# Patient Record
Sex: FEMALE | Race: WHITE | Employment: OTHER | URBAN - METROPOLITAN AREA
[De-identification: names, ages, dates, MRNs, and addresses within clinical notes are randomized per-mention and may not be internally consistent; named-entity substitution may affect disease eponyms.]

---

## 2017-01-13 PROBLEM — M16.11 ARTHRITIS OF RIGHT HIP: Status: ACTIVE | Noted: 2017-01-13

## 2017-01-16 PROBLEM — M16.11 PRIMARY OSTEOARTHRITIS OF RIGHT HIP: Status: ACTIVE | Noted: 2017-01-16

## 2017-06-21 PROBLEM — Z11.59 NEED FOR HEPATITIS C SCREENING TEST: Status: ACTIVE | Noted: 2017-06-21

## 2017-06-21 PROBLEM — Z12.31 SCREENING MAMMOGRAM, ENCOUNTER FOR: Status: ACTIVE | Noted: 2017-06-21

## 2017-06-21 PROBLEM — Z79.899 ENCOUNTER FOR LONG-TERM (CURRENT) USE OF MEDICATIONS: Status: ACTIVE | Noted: 2017-06-21

## 2017-06-21 PROBLEM — R73.01 ELEVATED FASTING GLUCOSE: Status: ACTIVE | Noted: 2017-06-21

## 2017-12-20 PROBLEM — Z79.899 ENCOUNTER FOR LONG-TERM (CURRENT) USE OF MEDICATIONS: Status: ACTIVE | Noted: 2017-12-20

## 2017-12-20 PROBLEM — Z11.59 NEED FOR HEPATITIS C SCREENING TEST: Status: ACTIVE | Noted: 2017-12-20

## 2017-12-20 PROBLEM — Z12.31 SCREENING MAMMOGRAM, ENCOUNTER FOR: Status: ACTIVE | Noted: 2017-12-20

## 2017-12-20 PROBLEM — Z28.21 REFUSED INFLUENZA VACCINE: Status: ACTIVE | Noted: 2017-12-20

## 2018-06-20 PROBLEM — Z12.31 SCREENING MAMMOGRAM, ENCOUNTER FOR: Status: RESOLVED | Noted: 2017-12-20 | Resolved: 2018-06-20

## 2018-06-20 PROBLEM — Z79.899 ENCOUNTER FOR LONG-TERM (CURRENT) USE OF MEDICATIONS: Status: RESOLVED | Noted: 2017-12-20 | Resolved: 2018-06-20

## 2018-11-05 ENCOUNTER — LAB ENCOUNTER (OUTPATIENT)
Dept: LAB | Age: 68
End: 2018-11-05
Attending: DERMATOLOGY
Payer: MEDICARE

## 2018-11-05 DIAGNOSIS — L65.9 BALDNESS: ICD-10-CM

## 2018-11-05 DIAGNOSIS — Z79.899 NEED FOR PROPHYLACTIC CHEMOTHERAPY: ICD-10-CM

## 2018-11-05 DIAGNOSIS — Z00.00 EXAMINATION: ICD-10-CM

## 2018-11-05 DIAGNOSIS — L25.9 INTERFACE DERMATITIS: Primary | ICD-10-CM

## 2018-11-05 PROCEDURE — 82607 VITAMIN B-12: CPT

## 2018-11-05 PROCEDURE — 82728 ASSAY OF FERRITIN: CPT

## 2018-11-05 PROCEDURE — 84630 ASSAY OF ZINC: CPT

## 2019-02-08 PROBLEM — E66.811 CLASS 1 OBESITY WITHOUT SERIOUS COMORBIDITY WITH BODY MASS INDEX (BMI) OF 31.0 TO 31.9 IN ADULT, UNSPECIFIED OBESITY TYPE: Status: ACTIVE | Noted: 2019-02-08

## 2019-02-08 PROBLEM — E66.9 CLASS 1 OBESITY WITHOUT SERIOUS COMORBIDITY WITH BODY MASS INDEX (BMI) OF 31.0 TO 31.9 IN ADULT, UNSPECIFIED OBESITY TYPE: Status: ACTIVE | Noted: 2019-02-08

## 2019-04-30 PROBLEM — Z99.89 OSA ON CPAP: Status: ACTIVE | Noted: 2019-04-30

## 2019-04-30 PROBLEM — G47.33 OSA ON CPAP: Status: ACTIVE | Noted: 2019-04-30

## 2019-11-29 PROBLEM — Z96.641 S/P HIP REPLACEMENT, RIGHT: Status: ACTIVE | Noted: 2019-11-29

## 2019-11-29 PROBLEM — M16.11 PRIMARY OSTEOARTHRITIS OF RIGHT HIP: Status: RESOLVED | Noted: 2017-01-16 | Resolved: 2019-11-29

## 2020-06-04 PROBLEM — H02.833 DERMATOCHALASIS OF EYELIDS OF BOTH EYES: Status: ACTIVE | Noted: 2017-08-22

## 2020-06-04 PROBLEM — L98.8: Status: ACTIVE | Noted: 2018-01-09

## 2020-06-04 PROBLEM — L30.0 NUMMULAR DERMATITIS: Status: ACTIVE | Noted: 2017-02-22

## 2020-06-04 PROBLEM — H02.836 DERMATOCHALASIS OF EYELIDS OF BOTH EYES: Status: ACTIVE | Noted: 2017-08-22

## 2020-06-04 PROBLEM — M79.18 MYOFASCIAL PAIN: Status: ACTIVE | Noted: 2019-02-08

## 2020-06-04 PROBLEM — N95.2 VAGINAL ATROPHY: Status: ACTIVE | Noted: 2018-11-03

## 2020-06-04 PROBLEM — L82.1 SEBORRHEIC KERATOSES: Status: ACTIVE | Noted: 2017-08-22

## 2020-08-03 PROBLEM — L82.1 SEBORRHEIC KERATOSES: Status: RESOLVED | Noted: 2017-08-22 | Resolved: 2020-08-03

## 2020-08-03 PROBLEM — H02.833 DERMATOCHALASIS OF EYELIDS OF BOTH EYES: Status: RESOLVED | Noted: 2017-08-22 | Resolved: 2020-08-03

## 2020-08-03 PROBLEM — L30.0 NUMMULAR DERMATITIS: Status: RESOLVED | Noted: 2017-02-22 | Resolved: 2020-08-03

## 2020-08-03 PROBLEM — M79.18 MYOFASCIAL PAIN: Status: RESOLVED | Noted: 2019-02-08 | Resolved: 2020-08-03

## 2020-08-03 PROBLEM — L98.8: Status: RESOLVED | Noted: 2018-01-09 | Resolved: 2020-08-03

## 2020-08-03 PROBLEM — H02.836 DERMATOCHALASIS OF EYELIDS OF BOTH EYES: Status: RESOLVED | Noted: 2017-08-22 | Resolved: 2020-08-03

## 2020-08-03 PROBLEM — N95.2 VAGINAL ATROPHY: Status: RESOLVED | Noted: 2018-11-03 | Resolved: 2020-08-03

## 2021-03-15 PROBLEM — N18.31 STAGE 3A CHRONIC KIDNEY DISEASE (HCC): Status: ACTIVE | Noted: 2021-03-15

## 2021-03-15 PROBLEM — F33.42 RECURRENT MAJOR DEPRESSIVE DISORDER, IN FULL REMISSION (HCC): Status: ACTIVE | Noted: 2021-03-15

## 2021-03-15 PROBLEM — F33.42 RECURRENT MAJOR DEPRESSIVE DISORDER, IN FULL REMISSION: Status: ACTIVE | Noted: 2021-03-15

## 2021-09-21 PROBLEM — R73.01 ELEVATED FASTING GLUCOSE: Status: RESOLVED | Noted: 2017-06-21 | Resolved: 2021-09-21

## 2021-09-21 PROBLEM — R73.01 IFG (IMPAIRED FASTING GLUCOSE): Status: ACTIVE | Noted: 2017-06-21

## 2023-08-03 ENCOUNTER — TELEPHONE (OUTPATIENT)
Dept: ORTHOPEDICS CLINIC | Facility: CLINIC | Age: 73
End: 2023-08-03

## 2023-08-03 DIAGNOSIS — M25.511 RIGHT SHOULDER PAIN, UNSPECIFIED CHRONICITY: Primary | ICD-10-CM

## 2023-08-03 NOTE — TELEPHONE ENCOUNTER
Last Imaging  XR SHOULDER, COMPLETE (MIN 2 VIEWS), RIGHT (CPT=73030)  DATE OF SERVICE: 07.27.2021  XR SHOULDER, COMPLETE (MIN 2 VIEWS), RIGHT (CPT=73030)    HISTORY: Chronic right shoulder pain. COMPARISON: None. TECHNIQUE: Right shoulder radiographs, 3 images. FINDINGS: There is no evidence for acute fracture or traumatic subluxation. Mild degenerative  changes are seen within the right acromioclavicular joint. The right glenohumeral joint space is  preserved. Soft tissues are within normal limits. IMPRESSION:   1. No acute osseous abnormality. 2. Mild right acromioclavicular joint arthritis.        Future Appointments   Date Time Provider Mere Shannon   8/9/2023  9:40 AM Montse Hurt MD [facility] [id number]

## 2023-08-03 NOTE — TELEPHONE ENCOUNTER
Patient is scheduled with Dr. Renee Barnes for right shoulder pain. Please advise if imaging is needed.

## 2023-08-09 ENCOUNTER — HOSPITAL ENCOUNTER (OUTPATIENT)
Dept: GENERAL RADIOLOGY | Age: 73
Discharge: HOME | End: 2023-08-09
Attending: ORTHOPAEDIC SURGERY
Payer: MEDICARE

## 2023-08-09 ENCOUNTER — OFFICE VISIT (OUTPATIENT)
Dept: ORTHOPEDICS CLINIC | Facility: CLINIC | Age: 73
End: 2023-08-09
Payer: MEDICARE

## 2023-08-09 VITALS — HEIGHT: 65 IN | BODY MASS INDEX: 28.16 KG/M2 | WEIGHT: 169 LBS

## 2023-08-09 DIAGNOSIS — M75.21 BICEPS TENDINITIS OF RIGHT UPPER EXTREMITY: ICD-10-CM

## 2023-08-09 DIAGNOSIS — M75.41 SUBACROMIAL IMPINGEMENT OF RIGHT SHOULDER: ICD-10-CM

## 2023-08-09 DIAGNOSIS — M75.81 TENDINITIS OF RIGHT ROTATOR CUFF: Primary | ICD-10-CM

## 2023-08-09 DIAGNOSIS — M25.511 RIGHT SHOULDER PAIN, UNSPECIFIED CHRONICITY: ICD-10-CM

## 2023-08-09 PROCEDURE — 73030 X-RAY EXAM OF SHOULDER: CPT | Performed by: ORTHOPAEDIC SURGERY

## 2023-08-09 PROCEDURE — 1125F AMNT PAIN NOTED PAIN PRSNT: CPT | Performed by: ORTHOPAEDIC SURGERY

## 2023-08-09 PROCEDURE — 99203 OFFICE O/P NEW LOW 30 MIN: CPT | Performed by: ORTHOPAEDIC SURGERY

## 2023-08-09 PROCEDURE — 1159F MED LIST DOCD IN RCRD: CPT | Performed by: ORTHOPAEDIC SURGERY

## 2023-08-09 PROCEDURE — 3008F BODY MASS INDEX DOCD: CPT | Performed by: ORTHOPAEDIC SURGERY

## 2023-08-09 RX ORDER — MELOXICAM 15 MG/1
15 TABLET ORAL DAILY
Qty: 7 TABLET | Refills: 0 | Status: SHIPPED | OUTPATIENT
Start: 2023-08-09

## 2023-09-01 ENCOUNTER — MED REC SCAN ONLY (OUTPATIENT)
Dept: ORTHOPEDICS CLINIC | Facility: CLINIC | Age: 73
End: 2023-09-01

## 2023-09-27 ENCOUNTER — TELEPHONE (OUTPATIENT)
Dept: ORTHOPEDICS CLINIC | Facility: CLINIC | Age: 73
End: 2023-09-27

## 2023-09-27 DIAGNOSIS — M54.50 LUMBAR PAIN: Primary | ICD-10-CM

## 2023-09-27 NOTE — TELEPHONE ENCOUNTER
Patient is scheduled with Dr. Cierra Driver for lower back pain. Please advise if imaging is needed.

## 2023-10-01 ENCOUNTER — MED REC SCAN ONLY (OUTPATIENT)
Dept: ORTHOPEDICS CLINIC | Facility: CLINIC | Age: 73
End: 2023-10-01

## 2023-10-10 ENCOUNTER — HOSPITAL ENCOUNTER (OUTPATIENT)
Dept: GENERAL RADIOLOGY | Age: 73
Discharge: HOME | End: 2023-10-10
Attending: STUDENT IN AN ORGANIZED HEALTH CARE EDUCATION/TRAINING PROGRAM
Payer: MEDICARE

## 2023-10-10 ENCOUNTER — OFFICE VISIT (OUTPATIENT)
Dept: ORTHOPEDICS CLINIC | Facility: CLINIC | Age: 73
End: 2023-10-10
Payer: MEDICARE

## 2023-10-10 VITALS — BODY MASS INDEX: 28.32 KG/M2 | WEIGHT: 170 LBS | HEIGHT: 65 IN

## 2023-10-10 DIAGNOSIS — M54.50 LUMBAR PAIN: ICD-10-CM

## 2023-10-10 DIAGNOSIS — M51.36 DEGENERATIVE DISC DISEASE, LUMBAR: Primary | ICD-10-CM

## 2023-10-10 PROCEDURE — 72100 X-RAY EXAM L-S SPINE 2/3 VWS: CPT | Performed by: STUDENT IN AN ORGANIZED HEALTH CARE EDUCATION/TRAINING PROGRAM

## 2023-10-10 PROCEDURE — 1125F AMNT PAIN NOTED PAIN PRSNT: CPT | Performed by: STUDENT IN AN ORGANIZED HEALTH CARE EDUCATION/TRAINING PROGRAM

## 2023-10-10 PROCEDURE — 3008F BODY MASS INDEX DOCD: CPT | Performed by: STUDENT IN AN ORGANIZED HEALTH CARE EDUCATION/TRAINING PROGRAM

## 2023-10-10 PROCEDURE — 1159F MED LIST DOCD IN RCRD: CPT | Performed by: STUDENT IN AN ORGANIZED HEALTH CARE EDUCATION/TRAINING PROGRAM

## 2023-10-10 PROCEDURE — 99203 OFFICE O/P NEW LOW 30 MIN: CPT | Performed by: STUDENT IN AN ORGANIZED HEALTH CARE EDUCATION/TRAINING PROGRAM

## 2023-10-27 ENCOUNTER — MED REC SCAN ONLY (OUTPATIENT)
Dept: ORTHOPEDICS CLINIC | Facility: CLINIC | Age: 73
End: 2023-10-27

## 2023-11-01 ENCOUNTER — OFFICE VISIT (OUTPATIENT)
Dept: ORTHOPEDICS CLINIC | Facility: CLINIC | Age: 73
End: 2023-11-01
Payer: MEDICARE

## 2023-11-01 ENCOUNTER — MED REC SCAN ONLY (OUTPATIENT)
Dept: ORTHOPEDICS CLINIC | Facility: CLINIC | Age: 73
End: 2023-11-01

## 2023-11-01 DIAGNOSIS — M75.81 TENDINITIS OF RIGHT ROTATOR CUFF: Primary | ICD-10-CM

## 2023-11-01 PROCEDURE — 99213 OFFICE O/P EST LOW 20 MIN: CPT | Performed by: PHYSICIAN ASSISTANT

## 2023-11-01 PROCEDURE — 1159F MED LIST DOCD IN RCRD: CPT | Performed by: PHYSICIAN ASSISTANT

## 2023-11-01 PROCEDURE — 1125F AMNT PAIN NOTED PAIN PRSNT: CPT | Performed by: PHYSICIAN ASSISTANT

## 2023-11-01 PROCEDURE — 1160F RVW MEDS BY RX/DR IN RCRD: CPT | Performed by: PHYSICIAN ASSISTANT

## 2023-11-01 PROCEDURE — 20610 DRAIN/INJ JOINT/BURSA W/O US: CPT | Performed by: PHYSICIAN ASSISTANT

## 2023-11-01 RX ORDER — LOTEPREDNOL ETABONATE 5 MG/ML
SUSPENSION/ DROPS OPHTHALMIC
COMMUNITY
Start: 2023-10-18

## 2023-11-01 RX ORDER — CYCLOSPORINE/CHONDROITIN SULFATE PF 1 MG/ML
1 EMULSION OPHTHALMIC 2 TIMES DAILY
COMMUNITY
Start: 2023-08-18

## 2023-11-01 RX ORDER — PHYTONADIONE (VIT K1) 100 MCG
TABLET ORAL AS DIRECTED
COMMUNITY

## 2023-11-01 RX ORDER — SERTRALINE HYDROCHLORIDE 25 MG/1
TABLET, FILM COATED ORAL DAILY
COMMUNITY

## 2023-11-01 RX ORDER — AMOXICILLIN 500 MG/1
500 CAPSULE ORAL EVERY 8 HOURS
COMMUNITY
Start: 2023-07-27

## 2023-11-01 RX ORDER — TRIAMCINOLONE ACETONIDE 40 MG/ML
40 INJECTION, SUSPENSION INTRA-ARTICULAR; INTRAMUSCULAR ONCE
Status: COMPLETED | OUTPATIENT
Start: 2023-11-01 | End: 2023-11-01

## 2023-11-01 RX ORDER — AMOXICILLIN AND CLAVULANATE POTASSIUM 875; 125 MG/1; MG/1
1 TABLET, FILM COATED ORAL EVERY 12 HOURS
COMMUNITY
Start: 2023-07-31

## 2023-11-01 RX ORDER — CLONAZEPAM 0.5 MG/1
0.5 TABLET ORAL 2 TIMES DAILY PRN
COMMUNITY
Start: 2023-10-19

## 2023-11-01 RX ORDER — IBUPROFEN 800 MG/1
TABLET ORAL
COMMUNITY
Start: 2023-07-27

## 2023-11-01 RX ORDER — MECLIZINE HCL 12.5 MG/1
1 TABLET ORAL 3 TIMES DAILY PRN
COMMUNITY
Start: 2023-10-21

## 2023-11-01 RX ORDER — ARIPIPRAZOLE 2 MG/1
2 TABLET ORAL NIGHTLY
COMMUNITY
Start: 2023-09-15

## 2023-11-01 RX ORDER — TRIAMCINOLONE ACETONIDE 1 MG/G
CREAM TOPICAL
COMMUNITY
Start: 2023-09-19

## 2023-11-01 RX ORDER — KETOROLAC TROMETHAMINE 30 MG/ML
30 INJECTION, SOLUTION INTRAMUSCULAR; INTRAVENOUS ONCE
Status: COMPLETED | OUTPATIENT
Start: 2023-11-01 | End: 2023-11-01

## 2023-11-01 RX ADMIN — TRIAMCINOLONE ACETONIDE 40 MG: 40 INJECTION, SUSPENSION INTRA-ARTICULAR; INTRAMUSCULAR at 10:15:00

## 2023-11-01 RX ADMIN — KETOROLAC TROMETHAMINE 30 MG: 30 INJECTION, SOLUTION INTRAMUSCULAR; INTRAVENOUS at 10:15:00

## 2023-11-01 NOTE — PROCEDURES
Right Shoulder Glenohumeral Joint Injection    Name: Derick Khan   MRN: [id number]  Date: 11/1/2023     Clinical Indications:   Persistent Shoulder pain refractory to conservative measures. After informed consent, the injection site was marked, sterilized with topical chlorhexidine antiseptic, and locally anesthetized with skin refrigerant. The patient was seated upright and the shoulder was exposed. Using sterile technique: 1 mL of 30mg/mL of Ketorolac, 2 mL of 0.5% Bupivicaine, 2 mL of 1% Lidocaine, and 1 mL of 40 mg/ml Triamcinolonewas injected with a Anterior approach utilizing a 21 gauge needle. A band-aid was applied. The patient tolerated the procedure well. Disposition:   Return to clinic on an as needed basis. CARLOS Coto, MADHU Orthopedic Surgery / Sports Medicine Specialist  [facility] Orthopaedic Surgery  [address], [address]   [facility] Jose Antonio. [employer]  Sincer. [email or web address]  t: [phone]  o: [phone]  f: [phone]          This note was dictated using Dragon software. While it was briefly proofread prior to completion, some grammatical, spelling, and word choice errors due to dictation may still occur.

## 2024-01-24 ENCOUNTER — MED REC SCAN ONLY (OUTPATIENT)
Dept: ORTHOPEDICS CLINIC | Facility: CLINIC | Age: 74
End: 2024-01-24

## 2024-02-05 ENCOUNTER — MED REC SCAN ONLY (OUTPATIENT)
Dept: ORTHOPEDICS CLINIC | Facility: CLINIC | Age: 74
End: 2024-02-05

## 2024-02-07 ENCOUNTER — OFFICE VISIT (OUTPATIENT)
Dept: ORTHOPEDICS CLINIC | Facility: CLINIC | Age: 74
End: 2024-02-07
Payer: MEDICARE

## 2024-02-07 DIAGNOSIS — S46.001A INJURY OF RIGHT ROTATOR CUFF, INITIAL ENCOUNTER: Primary | ICD-10-CM

## 2024-02-07 PROCEDURE — 99214 OFFICE O/P EST MOD 30 MIN: CPT | Performed by: ORTHOPAEDIC SURGERY

## 2024-02-07 NOTE — PROGRESS NOTES
Orthopaedic Surgery  [address] [place] [address]  [phone]       Name: Marilyn Pichardo   MRN: [id number]  Date: 2/7/2024     REASON FOR VISIT: Follow-Up of Right Shoulder Pain     INTERVAL HISTORY:  Marilyn Pichardo is a 74 year old right-hand dominant female who presents today for follow up of right shoulder pain.     To summarize: right shoulder pain onset over 10 years ago. Denies any history of traumatic injury to the shoulder but suspects overuse injury as she is very active. She recently had an episode of sharp pain with spasm in the shoulder while lifting a painting. Patient has listed history of CKD but she denies that she is being treated for any kidney issues. She was recently taking Ibuprofen 800 mg for tooth issue. However this did not improve her shoulder pain.    Today, she returns after receiving a steroid injection from Sincer Suleiman on 11/01/23. She now has recurrent pain up to 3-8/10. This worsened after she tried to push through an overly heavy door.      She lives at home with her . She works as a . Enjoys walking for exercise and fostering dog.       PE:   There were no vitals filed for this visit.  Estimated body mass index is 28.29 kg/m² as calculated from the following:    Height as of 10/10/23: 5' 5\" (1.651 m).    Weight as of 10/10/23: 170 lb.    Physical Exam  Constitutional:       Appearance: Normal appearance.   HENT:      Head: Normocephalic and atraumatic.   Eyes:      Extraocular Movements: Extraocular movements intact.   Neck:      Musculoskeletal: Normal range of motion and neck supple.   Cardiovascular:      Pulses: Normal pulses.   Pulmonary:      Effort: Pulmonary effort is normal. No respiratory distress.   Abdominal:      General: There is no distension.   Skin:     General: Skin is warm.      Capillary Refill: Capillary refill takes less than 2 seconds.      Findings: No bruising.   Neurological:      General: No focal deficit present.       Mental Status: She is alert.   Psychiatric:         Mood and Affect: Mood normal.     Examination of the right shoulder demonstrates:     Supraspinatus 4/5, remainder of rotator cuff tendons are 5 out of 5 strength.  Evidence of mild subacromial impingement.  O'bernadette's: Positive    Radiographic Examination/Diagnostics:    X-ray personally viewed, independently interpreted and radiology report was reviewed.    PROCEDURE:  XR SHOULDER, COMPLETE (MIN 2 VIEWS), RIGHT (CPT=73030)     TECHNIQUE:  Multiple views were obtained.     COMPARISON:  None.     INDICATIONS:  M25.511 Right shoulder pain, unspecified chronicity     PATIENT STATED HISTORY: (As transcribed by Technologist)  Patient is having an orthopedic evaluation.  Patient complains of pain in her Right shoulder that comes and goes for a few months with no known injury.         FINDINGS:  The glenohumeral joint is intact.  There is moderate acromioclavicular joint hypertrophy.  There is no acute fracture.     Impression  CONCLUSION:  There is AC joint hypertrophy.  There is no acute fracture.     LOCATION:  [facility]     Dictated by (CST): Jack Gomes MD on 8/09/2023 at 10:02 AM      Finalized by (CST): Jack Gomes MD on 8/09/2023 at 10:03 AM        IMPRESSION: Marilyn Pichardo is a 74 year old suspected injury of right rotator cuff sustained 10 years ago due to trying to push a heavy door. Her previous steroid injection on 11/01/23 provided temporary relief.  She has additionally completed a course of physical therapy without symptomatic resolution.    In light of physical findings, we elected to order an MRI to further characterize internal derangement.    PLAN:   We had a detailed discussion outlining the etiology, anatomy, pathophysiology, and natural history of patient's findings. Imaging was reviewed in detail and correlated to a 3-dimensional model of the shoulder.      In light of the chronicity of symptoms, loss of normal function, and  failure to progress  conservatively we recommend an MRI to evaluate the integrity of the patient's findings. The patient will follow up after imaging.   Differential diagnosis includes but not limited to: rotator cuff/labral pathology, impingement, tendinopathy, cartilage injury/loose body, bone marrow edema, and osteoarthritis.     External records were also reviewed for pertinent historical findings contributing to the patients undiagnosed new problem with uncertain prognosis.     We discussed the possibilities dependent on the MRI findings; she may have to have an arthroscopic procedure.    The patient had the opportunity to ask questions, and all questions were answered appropriately.        FOLLOW-UP:   Return to clinic after MRI completion.       Marie Taveras MD  Knee, Shoulder, & Elbow Surgery / Sports Medicine Specialist  Orthopaedic Surgery  [address] [place] [address].org  [email or web address]  t: [phone]  o: [phone]  f: [phone]    This note was dictated using Dragon software.  While it was briefly proofread prior to completion, some grammatical, spelling, and word choice errors due to dictation may still occur.

## 2024-02-13 ENCOUNTER — OFFICE VISIT (OUTPATIENT)
Dept: ORTHOPEDICS CLINIC | Facility: CLINIC | Age: 74
End: 2024-02-13
Payer: MEDICARE

## 2024-02-13 VITALS — WEIGHT: 170 LBS | BODY MASS INDEX: 28.32 KG/M2 | HEIGHT: 65 IN

## 2024-02-13 DIAGNOSIS — G89.29 CHRONIC RIGHT-SIDED LOW BACK PAIN WITH RIGHT-SIDED SCIATICA: Primary | ICD-10-CM

## 2024-02-13 DIAGNOSIS — M54.41 CHRONIC RIGHT-SIDED LOW BACK PAIN WITH RIGHT-SIDED SCIATICA: Primary | ICD-10-CM

## 2024-02-13 DIAGNOSIS — G89.29 CHRONIC RIGHT-SIDED LOW BACK PAIN WITH RIGHT-SIDED SCIATICA: ICD-10-CM

## 2024-02-13 DIAGNOSIS — M54.41 CHRONIC RIGHT-SIDED LOW BACK PAIN WITH RIGHT-SIDED SCIATICA: ICD-10-CM

## 2024-02-13 DIAGNOSIS — M25.551 GREATER TROCHANTERIC PAIN SYNDROME OF RIGHT LOWER EXTREMITY: Primary | ICD-10-CM

## 2024-02-13 PROCEDURE — 99214 OFFICE O/P EST MOD 30 MIN: CPT | Performed by: FAMILY MEDICINE

## 2024-02-13 PROCEDURE — 99214 OFFICE O/P EST MOD 30 MIN: CPT | Performed by: STUDENT IN AN ORGANIZED HEALTH CARE EDUCATION/TRAINING PROGRAM

## 2024-02-13 PROCEDURE — 20611 DRAIN/INJ JOINT/BURSA W/US: CPT | Performed by: FAMILY MEDICINE

## 2024-02-13 RX ORDER — TRIAMCINOLONE ACETONIDE 40 MG/ML
40 INJECTION, SUSPENSION INTRA-ARTICULAR; INTRAMUSCULAR ONCE
Status: COMPLETED | OUTPATIENT
Start: 2024-02-13 | End: 2024-02-13

## 2024-02-13 RX ADMIN — TRIAMCINOLONE ACETONIDE 40 MG: 40 INJECTION, SUSPENSION INTRA-ARTICULAR; INTRAMUSCULAR at 12:14:00

## 2024-02-13 NOTE — PROGRESS NOTES
[facility] - ORTHOPEDICS  [address] [place] [address], [address] [address]  [address] [place] [address]  [phone]     FOLLOW-UP PATIENT VISIT    Name: Marilyn Pichardo   MRN: [id number]  Date: 2/13/2024     CC:   Chief Complaint   Patient presents with    Follow - Up     Low back pain, Following up since fall, PT for months, sometimes feels ok, but pain is always there, pain has not gone away. She can take aleve to make pain go away. Therapist states that it is her SI joint, and some pain in Right hip that she had replaced. Heat and dry needling.      INTERVAL HISTORY:   Marilyn Pichardo is a 74 year old female  follow-up patient whom I have been treating conservatively. Patient returns today for reevaluation of right sided low back pain with right radicular pain through the right buttock and laterally to the right greater troch area. Intermittent numbness of the right sole of foot and right toes. Has had multiple surgeries on the right foot. Has known gait change attributed to leg length discrepancy since ARTHUR in 2019, wears a shoe lift in the left shoe. Pain in the back has continued since 7/2023 after attempting to lift her spouse when he fell. Continues with shoulder surgeon for right shoulder pain.     The patient reports moderate Pain today. The distribution of symptoms are: 90% back pain and 10% right leg pain. The patient reports intermittent right foot numbness and no weakness.    Bowel and bladder symptoms: absent.    The patient has not had issues with balance and/or hand dexterity problems such as changes in penmanship or the use of buttons or zippers.    We have tried the following interventions thus far: start physical therapy in 10/2023 and continues to attend PT today. Reports + HEP with partial relief.   Aleve with partial relief.     ROS: No fever/chills or other constitutional issues.    PE:   Vitals:    02/13/24 1120   Weight: 170 lb (77.1 kg)   Height: 5'  5\" (1.651 m)     Estimated body mass index is 28.29 kg/m² as calculated from the following:    Height as of this encounter: 5' 5\" (1.651 m).    Weight as of this encounter: 170 lb (77.1 kg).    On physical examination, she is awake, alert and oriented x 3 and in no acute distress. Mood, affect and language are normal. She appears well developed and well nourished.  She walks without a nonantalgic, nonmyelopathic, non-Trendelenburg gait. Motor strength testing of the lower extremities shows 5/5 strength in hip flexors, knee extensors, ankle dorsiflexors, toe extensor, and gastroc-soleus complex.   Sensation is intact to light touch L2-S1 distributions bilaterally. Reflexes 2+.   SLR negative bilaterally  + palp tenderness over the right greater troch and right SI joint area.     Radiographic Examination/Diagnostics:  xray personally viewed, independently interpreted and radiology report was reviewed.    Xray lumbar spine  Dated: 10/10/2023  X-ray of the lumbar spine demonstrates moderate degenerative disc disease L5-S1. Mild anterolisthesis at L4-5     IMPRESSION: Marilyn Pichardo is a 74 year old female with  1. Chronic right-sided low back pain with right-sided sciatica  - MRI SPINE LUMBAR (CPT=72148); Future  - Pain Management Referral - In Network      PLAN:   We had a detailed discussion outlining the etiology, anatomy, pathophysiology, and natural history of LBP with right radicular leg pain. Advised to continue physical therapy, continue HEP, complete a MRI of the lumbar spine, see interventional pain clinic after the MRI of the lumbar spine for possible injection therapy, including SI joint injection. Coordinate care with Sports medicine partner today to receive a right greater troch injection.     FOLLOW-UP:  We will see her back in follow-up in after updated images, or sooner if any problems arise. Patient understands and agrees with plan.    I spent 30 minutes in preparation to see the patient,  counseling/education of relevant pathology, discussing imaging results, ordering medication/therapy intervention, and care coordination.        Juan Luis Trinidad MD  Orthopedic Spine Surgeon  [facility]   [address] [place] [address], [address].[facility]  [email or web address]  t: [phone]   f: [phone]        This note was dictated using Dragon software.  While it was briefly proofread prior to completion, some grammatical, spelling, and word choice errors due to dictation may still occur.

## 2024-02-13 NOTE — H&P
Sports Medicine Clinic Note     Subjective:     Chief Complaint: Referred by Dr. Trinidad for trochanteric bursa injection    History of Present Illness: This is a 74 year old who presents with complaints of right lateral hip pain that has been gradually worsening over the past several weeks but notes this seems to have been present on and off ever since her right ARTHUR in 2019. She denies any recent trauma but notes that the pain is exacerbated with prolonged standing and walking. She has attempted self-management with OTC analgesics and rest but has had minimal relief. Pain is described as a dull ache localized over the greater trochanter, with occasional radiation down the lateral thigh.    She was seen by my colleague Dr. Juan Luis Trinidad earlier today with a chief complaint of chronic right-sided low back pain with right-sided sciatica. MRI of the lumbar spine is ordered to further investigate this but greater trochanteric pain syndrome was clinically suspected at this visit by Dr. Trinidad and she was sent to me for consideration of injection therapy to address this.    Objective:     Physical Exam    Constitutional: NAD. AOx3. Well-developed and Well-nourished.   Psychiatric: Normal mood/ affect/ behavior. Judgment and thought content normal.    Focused Musculoskeletal Exam of Right Hip    Inspection  Non-antalgic gait  Skin without erythema, breakdown or rash  No gross leg length discrepancy appreciated    Palpation  Tenderness elicited over the greater trochanteric region.    ROM  Painless passive ROM: IR 25*/ER35*/Flexion 110*    Neurovascular  2+ dp/pt pulses  SILT intact in Fem/Tib/Saphenous/Sural/Deep & Superficial Peroneal nerves  Fires Quad/Hamstrings/GastrocSoleusComplex/AT/EHL muscle groups    Special  Negative FADIR  Negative SOLEDAD    Imaging    No recent radiographs of the right hip. Lumbar spine radiographs today showed lumbar spine spondylosis and anterolisthesis of L4 on L5 among other findings such as disc space  loss at L3-L4 and L5-S1. No acute appearing fractures.    Assessment & Plan:     74 year old female with clinical history and examination consistent with    Greater trochanteric pain syndrome, right hip  Chronic low back pain with right sciatica    The patient will be treated with the following:    A trial of conservative management is recommended. Physical therapy deferred for now in favor of better pain control but will revisit in future with a focus on stretching and strengthening exercises for hip abductors and rotators. Recommend Tylenol 1000 mg three times daily as needed for pain. After a comprehensive discussion about the potential benefits and limitations of injection therapy as a treatment option for the suspected diagnosis, the patient opted to proceed with a therapeutic injection during today's office visit. Suggest using a foam roller for self-myofascial release. No bracing indicated for this condition. An MRI is not indicated at this time. Activity modification advised, recommending the patient to avoid activities that exacerbate symptoms. Tentative follow-up in 2-4 weeks to assess response to injection, follow-up as planned with Dr. Trinidad's team for her lumbar spine issues. Plan for right hip radiographs prior to follow up for closer assessment of the right hip/pelvis if pain persists.    Ultrasound Guided Procedure Note:    After discussion of the risks and benefits, the patient elected to proceed with a therapeutic injection into the right trochanteric bursa under US guidance. Confirmed that the patient does not have history of prior adverse reactions, active infections, or relevant allergies. There was no erythema or warmth, and the skin was clear.    The skin was sterilized with ChloraPrep. A 22 gauge needle was inserted via inferolateral approach utilizing US for needle guidance and placement. The site was injected with a mixture of 1 mL of kenalog 40 mg/mL and 2 mL of 1% Lidocaine without  Epinephrine. The injection was completed without complication, and a bandage was applied.    The patient tolerated the procedure well and was instructed to avoid strenuous activity for the next 24-48 hours and to use ice or Tylenol for pain as needed. The patient will call immediately with any signs of infection or allergic reaction.    Post-Injection Care: The patient tolerated the procedure well. An occlusive bandage was placed over the injection site. Post-injection care instructions provided to the patient. The patient was asked to avoid strenuous activity and continue to rest the area for 2-3 days before resuming regular activities. Patient advised that the area may be more painful for the first 1-2 days. They can use ice or Tylenol for pain as needed.  Patient was instructed to watch for fever, increased swelling, or persistent pain. The patient will call immediately with any signs of infection or allergic reaction.    Complications: The patient tolerated the procedure well without any complications.    Dre Lorenzo DO, YOANDYM   Primary Care Sports Medicine    Department of Orthopaedic Surgery  [facility]    [address] [place] [address]   [address] [place] [address]    t: [phone]  f: [phone]      [facility]

## 2024-02-14 RX ORDER — DICLOFENAC SODIUM 75 MG/1
75 TABLET, DELAYED RELEASE ORAL 2 TIMES DAILY
Qty: 28 TABLET | Refills: 1 | Status: SHIPPED | OUTPATIENT
Start: 2024-02-14 | End: 2024-02-14

## 2024-03-20 ENCOUNTER — HOSPITAL ENCOUNTER (OUTPATIENT)
Dept: MRI IMAGING | Facility: HOSPITAL | Age: 74
Discharge: HOME | End: 2024-03-20
Attending: NURSE PRACTITIONER
Payer: MEDICARE

## 2024-03-20 ENCOUNTER — HOSPITAL ENCOUNTER (OUTPATIENT)
Dept: MRI IMAGING | Facility: HOSPITAL | Age: 74
Discharge: HOME | End: 2024-03-20
Attending: ORTHOPAEDIC SURGERY
Payer: MEDICARE

## 2024-03-20 DIAGNOSIS — M54.41 CHRONIC RIGHT-SIDED LOW BACK PAIN WITH RIGHT-SIDED SCIATICA: ICD-10-CM

## 2024-03-20 DIAGNOSIS — G89.29 CHRONIC RIGHT-SIDED LOW BACK PAIN WITH RIGHT-SIDED SCIATICA: ICD-10-CM

## 2024-03-20 DIAGNOSIS — S46.001A INJURY OF RIGHT ROTATOR CUFF, INITIAL ENCOUNTER: ICD-10-CM

## 2024-03-20 PROCEDURE — 73221 MRI JOINT UPR EXTREM W/O DYE: CPT | Performed by: ORTHOPAEDIC SURGERY

## 2024-03-20 PROCEDURE — 72148 MRI LUMBAR SPINE W/O DYE: CPT | Performed by: NURSE PRACTITIONER

## 2024-03-29 ENCOUNTER — OFFICE VISIT (OUTPATIENT)
Dept: ORTHOPEDICS CLINIC | Facility: CLINIC | Age: 74
End: 2024-03-29
Payer: MEDICARE

## 2024-03-29 DIAGNOSIS — S43.431A SUPERIOR GLENOID LABRUM LESION OF RIGHT SHOULDER, INITIAL ENCOUNTER: Primary | ICD-10-CM

## 2024-03-29 DIAGNOSIS — M75.41 SUBACROMIAL IMPINGEMENT OF RIGHT SHOULDER: ICD-10-CM

## 2024-03-29 DIAGNOSIS — M75.21 BICEPS TENDINITIS OF RIGHT UPPER EXTREMITY: ICD-10-CM

## 2024-03-29 DIAGNOSIS — M19.019 AC JOINT ARTHROPATHY: ICD-10-CM

## 2024-03-29 DIAGNOSIS — M75.81 TENDINITIS OF RIGHT ROTATOR CUFF: ICD-10-CM

## 2024-03-29 PROCEDURE — 99215 OFFICE O/P EST HI 40 MIN: CPT | Performed by: ORTHOPAEDIC SURGERY

## 2024-03-29 NOTE — PROGRESS NOTES
OR BOOKING SHEET SHOULDER  Location: [x] [facility]   [x] [facility]  Name: Marilyn Pichardo  MRN: [id number]   : 1950  Diagnos  [x] Superior glenoid labrum lesion of right shoulder, initial encounter [S43.056A]  Disposition:    [x] Ambulatory  [] Overnight for ARMAAN  [] Overnight for observation and pain control  [] Inpatient procedure    Operative Time Required:  2 hours ([facility])   Antibiotics: 2 g cefazolin within 60 minutes of surgical incision  Procedure:   Laterality: [x] RIGHT [] LEFT                  [] BILATERAL  Procedures:   [x] Shoulder Arthroscopy    [] Rotator Cuff Repair (25137)  [x] Arthroscopic Biceps Tenodesis (16850)  [x] Subacromial Decompression (77941)  [x] Distal Clavicle Excision (42749)    [] SLAP repair (61472)  [] Capsulorraphy / Bankart (49278)    Additional info:   [] PCP Clearance Needed  [] MRSA  [] C-Arm  [x] TXA at time surgery  [x] Physical Therapy External D P T Modestatina Gleason  [x] DME Rx Needed  [] Appt with Dr. Taveras needed  Implants needed: Arthrex  Positioning Equipment: Beach Chair Setup, [place]

## 2024-03-29 NOTE — PROGRESS NOTES
[facility] - ORTHOPEDICS  [address] [place] [address]  [phone]     PRE SURGICAL - HISTORY AND PHYSICAL EXAMINATION     Name: Marilyn Pichardo   MRN: [id number]  Date: 3/29/2024     CC: Right shoulder pain and weakness in the setting of SLAP/biceps tendon pathology.     HPI:   Marilyn Pichardo is a very pleasant 74 year old right-hand dominant female who presents today for evaluation of MRI scan of the shoulder and discussion regarding definitive management plan.     To summarize: right shoulder pain onset over 10 years ago. Denies any history of traumatic injury to the shoulder but suspects overuse injury as she is very active. She recently had an episode of sharp pain with spasm in the shoulder while lifting a painting. Patient has listed history of CKD but she denies that she is being treated for any kidney issues. She was recently taking Ibuprofen 800 mg for tooth issue. However this did not improve her shoulder pain. She received a steroid injection from Mervat Bearden on 11/01/23. Her pain worsened after she tried to push through an overly heavy door.     Today, she is in office for a follow-up. She has had a viral infection for about 3 weeks and has been pretty inactive. She has been taking Promazine for pain relief which helped a bit with her pain for her shoulder as well. She is concerned she is unable to put her dishes up in the cupboard.      She lives at home with her . She works as a . Enjoys walking for exercise and fostering dog.     PMH:   Past Medical History:   Diagnosis Date    ALLERGIC RHINITIS     BACK PAIN     Back problem     Cancer (HCC)     squamous cell skin cancer on arm and hand    Cervical disc syndrome Dx by MRI (7/04)  11/23/2010    Chronic rhinitis     Depression     Diverticulitis of colon (without mention of hemorrhage)(562.11)     Diverticulosis of colon (without mention of hemorrhage) 11/23/2010    Functional intestinal  disorder / Colonoscopy [3/18/14] - recheck 10 years - RAYSHAWN Haney 12/20/2010    H/O diverticulitis of colon [9/13], [12/13] 9/2/2013    HEADACHES     HIGH BLOOD PRESSURE     HIGH CHOLESTEROL     History of blood transfusion     HOSPITALIZATIONS     HYPERLIPIDEMIA     Mixed rhinitis [vasomotor & allergic] / Rx cetirizine & singulair 11/23/2010    MVP (mitral valve prolapse)     Obstructive apnea 07/28/2018    DMG SPLIT AHI 43 SaO2 cliff 87 % CPAP 8     Obstructive apnea 07/28/2018    DMG SPLIT AHI 43 SaO2 cliff 87 % CPAP 8 [facility]    ARMAAN on CPAP 4/30/2019    Osteoarthritis     Other and unspecified hyperlipidemia     PONV (postoperative nausea and vomiting)     Pseudophakia of both eyes - EDYTA España  6/8/2016    S/P hip replacement, right - 11/2019 11/29/2019    R anterior ARTHUR performed by Dr. Rivera 11/18/19     Seborrheic dermatitis [CASH Doty - dermatologist] 12/20/2010    Sprain of tarsometatarsal (joint) (ligament) of foot RT FOOT LISFRANC LIG GLOBAL THRU 8/11/14 5/14/2014    Unifocal PVCs / Holter (11/12) 11/18/2012    Unspecified essential hypertension     Visual impairment        PAST SURGICAL HX:  Past Surgical History:   Procedure Laterality Date    APPENDECTOMY      APPENDECTOMY      COLONOSCOPY  2009    diverticulosis and ischemic colitis    COLONOSCOPY  03/18/2014    Procedure: COLONOSCOPY;  Surgeon: Tino Haney MD;  Location:  ENDOSCOPY    FOOT FRACTURE SURGERY  05/2014    R foot 2nd metatarsal fusion/hardware    HYSTERECTOMY      INJECTION, ANESTHETIC/STEROID, TRANSFORAMINAL EPIDURAL; LUMBAR/SACRAL, SINGLE LEVEL Right 05/18/2016    Procedure: LUMBAR / TRANSFORAMINAL EPIDURAL STEROID INJECTION;  Surgeon: Vivi Corona MD;  [facility]    [facility]OPEDIC SURG ([facility])      right foot neuroma removed    ORTHOPEDIC SURG ([facility])      bilateral plantar fasciitis    OTHER  05/18/2018    Dr. Parish, eye lid lifted, plastic surgeon    OTHER SURGICAL HISTORY  2009    EGD - small hiatal hernia     PATIENT DOCUMENTED NOT TO HAVE EXPERIENCED ANY OF THE FOLLOWING EVENTS Right 05/18/2016    Procedure: LUMBAR / TRANSFORAMINAL EPIDURAL STEROID INJECTION;  Surgeon: Vivi Corona MD;  Location: [facility]    PATIENT WITHOUGH PREOPERATIVE ORDER FOR IV ANTIBIOTIC SURGICAL SITE INFECTION PROPHYLAXIS. Right 05/18/2016    Procedure: LUMBAR / TRANSFORAMINAL EPIDURAL STEROID INJECTION;  Surgeon: Vivi Corona MD;  Location: [facility]    REMOVAL OF TONSILS,12+ Y/O      TOTAL HIP ARTHROPLASTY Right 11/18/2019    with Dr. Rivera 11/18/19       FAMILY HX:  Family History   Problem Relation Age of Onset    Heart Disorder Father     Other (Other) Mother     Diabetes Maternal Grandmother        ALLERGIES:  Sodium lauryl sulfate, Oseltamivir, Other, Tamiflu, Morphine, Propylene glycol, and Sulfa antibiotics    MEDICATIONS:   Current Outpatient Medications   Medication Sig Dispense Refill    triamcinolone 0.1 % External Cream Apply a thin layer to rash on lower legs and forearms twice a day x 6 weeks.      sertraline 25 MG Oral Tab Take by mouth daily.      Vitamin K, Phytonadione, 100 MCG Oral Tab Take by mouth As Directed.      meclizine 12.5 MG Oral Tab Take 1 tablet (12.5 mg total) by mouth 3 (three) times daily as needed.      loteprednol 0.5 % Ophthalmic Suspension INSTILL 1 DROP INTO BOTH EYES 4 TIMES A DAY FOR 2 WEEKS, THEN TWICE A DAY FOR 2 WEEKS, THEN STOP      ibuprofen 800 MG Oral Tab TAKE 1 TABLET BY MOUTH 3 TIMES A DAY FOR THE FIRST 3 DAYS THEN EVERY 6 TO 8 HOURS AS NEEDED      cycloSPORINE (CYCLOSPORINE IN KLARITY) 0.1 % Ophthalmic Emulsion Place 1 drop into both eyes 2 (two) times daily.      clonazePAM 0.5 MG Oral Tab Take 1 tablet (0.5 mg total) by mouth 2 (two) times daily as needed.      ARIPiprazole 2 MG Oral Tab Take 1 tablet (2 mg total) by mouth nightly.      amoxicillin 500 MG Oral Cap Take 1 capsule (500 mg total) by mouth every 8 (eight) hours.      amoxicillin  clavulanate 875-125 MG Oral Tab Take 1 tablet by mouth Q12H.      Meloxicam 15 MG Oral Tab Take 1 tablet (15 mg total) by mouth daily. 7 tablet 0    LOSARTAN 100 MG Oral Tab TAKE 1 TABLET BY MOUTH EVERY DAY 90 tablet 0    finasteride 5 MG Oral Tab Take 0.5 tablets (2.5 mg total) by mouth daily.      SIMVASTATIN 20 MG Oral Tab TAKE 1 TABLET BY MOUTH EVERY DAY AT NIGHT 90 tablet 0    METFORMIN HCL  MG Oral Tablet 24 Hr TAKE 1 TABLET BY MOUTH EVERY DAY WITH BREAKFAST 90 tablet 0    Azelastine HCl 0.1 % Nasal Solution 1 spray by Nasal route 2 (two) times daily. 1 Bottle 0    Probiotic Oral Cap 1 capsule daily 30 capsule 0    Mirabegron ER 25 MG Oral Tablet 24 Hr Take 1 tablet (25 mg total) by mouth daily.      TraZODone HCl (DESYREL) 50 MG Oral Tab Take 1 tablet (50 mg total) by mouth nightly as needed.  0    BuPROPion HCl ER, XL, (WELLBUTRIN XL) 150 MG Oral Tablet 24 Hr Take 3 tablets (450 mg total) by mouth daily.      Polyethylene Glycol 3350 17 g Oral Powd Pack Take 17 g by mouth as needed.      Cetirizine HCl (ZYRTEC ALLERGY OR) Take  by mouth daily.      CO Q10 100 MG OR CAPS Take 2 Tabs by mouth daily.      CENTRUM SILVER ULTRA WOMENS OR 1 tablet daily      OMEGA 3-6-9 FATTY ACIDS OR 1000 mg daily      VITAMIN D3 1000 UNIT OR CAPS 1 CAPSULE DAILY      OSTEO BI-FLEX ADV DOUBLE ST OR 2 TABLET DAILY prn      SINGULAIR 10 MG OR TABS 1 TABLET EVERY EVENING 90 Tab 3       ROS: A comprehensive 14 point review of systems was performed and was negative aside from the aforementioned per history of present illness.    SOCIAL HX:  Social History     Tobacco Use    Smoking status: Never    Smokeless tobacco: Never   Substance Use Topics    Alcohol use: Yes     Alcohol/week: 1.0 standard drink of alcohol     Types: 1 Glasses of wine per week     Comment: 3 per week       PE:   There were no vitals filed for this visit.  Estimated body mass index is 28.29 kg/m² as calculated from the following:    Height as of 2/13/24: 5'  5\" (1.651 m).    Weight as of 2/13/24: 170 lb.    Physical Exam  Constitutional:       Appearance: Normal appearance.   HENT:      Head: Normocephalic and atraumatic.   Eyes:      Extraocular Movements: Extraocular movements intact.   Neck:      Musculoskeletal: Normal range of motion and neck supple.   Cardiovascular:      Pulses: Normal pulses.   Pulmonary:      Effort: Pulmonary effort is normal. No respiratory distress.   Abdominal:      General: There is no distension.   Skin:     General: Skin is warm.      Capillary Refill: Capillary refill takes less than 2 seconds.      Findings: No bruising.   Neurological:      General: No focal deficit present.      Mental Status: Alert.   Psychiatric:         Mood and Affect: Mood normal.     Examination of the right shoulder demonstrates:     Skin is intact, warm and dry.   Cervical:  Full ROM  Spurling's  Negative    Deformity:   none  Atrophy:   none    Scapular winging: Negative    Palpation:     AC Joint   Positive  Biceps Tendon  Positive  Greater Tuberosity Negative    ROM:   Forward Flexion:  150°  Abduction:   full and symmetric  External Rotation:  full and symmetric  Internal Rotation:  full and symmetric    Rotator Cuff Strength:   Supraspinatus:   5/5  Subscapularis:   5/5  Infraspinatus/Teres: 5/5    Provocative Tests:   Goyal:   Positive  Speed's:   Positive  Dent's:   Positive  Lift-off:    Negative  Apprehension:  Negative  Sulcus Sign:   Negative    Neurovascular Upper Extremity (Bilateral)  Motor:    5/5 EPL, Finger Abduction, , Pinch, Deltoid  Sensation:   intact to light touch median, ulnar, radial and axillary nerve  Circulation:   Normal, 2+ radial pulse    The contralateral upper extremity is without limitation in range of motion or strength, no positive provocative maneuvers.     Radiographic Examination/Diagnostics:    XR and MRI of the shoulder personally viewed, independently interpreted and radiology report was reviewed.    MRI  SPINE LUMBAR (CPT=72148)    Result Date: 3/20/2024  PROCEDURE:  MRI SPINE LUMBAR (CPT=72148)  COMPARISON:  MR ADELITA, SPINE CERV W WO CONTRAST MRI, 7/22/2004, 2:51 PM.  INDICATIONS:  G89.29 Chronic right-sided low back pain with right-sided sciatica M54.41 Chronic right-sided low back pain with right-sided sciatica  TECHNIQUE:  Multiplanar T1 and T2 weighted images including fat suppression sequences.  Images acquired in sagittal and axial planes.   PATIENT STATED HISTORY: (As transcribed by Technologist)  Patient complains of right sided low back pain for years.    FINDINGS: 2 mm retrolisthesis of L2 over L3 and L1 over L2. Vertebral body heights are maintained throughout the lumbar spine. Mild loss of L5-S1 disc space. Marrow signal is unremarkable. The conus is at L1. The visualized portion of the spinal cord is of normal caliber without focal signal abnormality. T12-L1:  Minimal disc bulge without spinal canal or neural foraminal stenosis. L1-L2:  Minimal disc bulge without spinal canal or neural foraminal stenosis. L2-L3:  Broad-based disc bulge without spinal canal or neural foraminal stenosis. L3-L4:  Minimal disc bulge with ligamentum flavum thickening.  No spinal canal or neural foraminal stenosis. L4-L5:  Broad-based disc bulge with ligamentum flavum thickening mild facet hypertrophic changes.  There is mild spinal canal stenosis without neural foraminal stenosis. L5-S1:  Minimal disc bulge with facet hypertrophic change.  No spinal canal or neural foraminal stenosis.            CONCLUSION:  No significant spinal canal or neural foraminal stenosis.   LOCATION:  [id number]   Dictated by (CST): Onur Neal MD on 3/20/2024 at 2:16 PM     Finalized by (CST): Onur Neal MD on 3/20/2024 at 2:18 PM       MRI SHOULDER, RIGHT (CPT=73221)    Result Date: 3/20/2024  PROCEDURE:  MRI SHOULDER, RIGHT (ZZX=55625)  COMPARISON:  ERIC Manrique, XR SHOULDER, COMPLETE (MIN 2 VIEWS), RIGHT (CPT=73030),  8/09/2023, 9:23 AM.  INDICATIONS:  S46.001A Injury of right rotator cuff, initial encounter  TECHNIQUE:  Multiplanar imaging of the shoulder including oblique coronal, axial and sagittal imaging was acquired including proton density fat suppression technique. Images were performed without intravenous gadolinium.  PATIENT STATED HISTORY: (As transcribed by Technologist)  Patient complains of right shoulder pain, worse to lateral aspect, and states pain started after feeling something \"pull\" after helping  get up and then after moving a heavy door. Patient has full ROM but painful.    FINDINGS:  ROTATOR CUFF:  There is marked thickening and increased signal diffusely within the substance of the supraspinatus tendon consistent with severe tendinopathy.  There is enthesophyte formation at the anterior insertion of supraspinatus tendon on the greater tuberosity.  Full-thickness rotator cuff tendon tear or tendon retraction.  Mild increased signal is also noted within the substance of the infraspinatus tendon without tear. MUSCULATURE:  No strain, edema, or atrophy.  AC JOINT/ACROMION:  There is acromioclavicular joint hypertrophy.  Inferiorly directed spurs of the acromioclavicular joint do cause mild impression on bursal surface of supraspinatus. BICEPS/LABRAL COMPLEX:  There is increased signal within the substance of intra-articular portion of long head of biceps as noted for example sagittal T2 fat-sat sequence series 8, image 10 consistent with biceps tendinopathy without tear.  There is diffuse degeneration of the entire glenoid labrum.  There is a more focal linear area of increased T2 signal within the superior labrum near biceps anchor as seen for example on series 5, image 14 which could represent a nondisplaced SLAP tear. GLENOHUMERAL JOINT:  There is no full-thickness cartilage defect.  There is mild osteophyte formation of humeral head.            CONCLUSION:  1. Severe tendinopathy and enthesophyte  formation at the insertion of the supraspinatus tendon.  There is no full-thickness tear. 2. Mild tendinopathy infraspinatus tendon without tear. 3. AC joint hypertrophy causing impression on bursal surface of supraspinatus. 4. Diffuse degeneration of glenoid labrum with a more focal linear defect in superior labrum near biceps anchor which could represent a nondisplaced SLAP tear.  There is no paralabral cyst. 5. Moderate tendinopathy intra-articular portion of long head of biceps without full-thickness tear. 6. Mild osteophyte formation of glenohumeral joint.    LOCATION:  [facility]   Dictated by (CST): Jack Gomes MD on 3/20/2024 at 2:06 PM     Finalized by (CST): Jack Gomes MD on 3/20/2024 at 2:13 PM         IMPRESSION: Marilyn Pichardo is a 74 year old female with Right shoulder superior labral tear, subacromial impingement, AC joint arthropathy, and biceps tendonitis.  She has completed extensive conservative treatment including corticosteroid injection, oral anti-inflammatory medications, physical therapy and activity modifications.    The patient has failed an appropriate course of nonsurgical conservative management and is a candidate for arthroscopic biceps tenodesis, subacromial decompression, distal clavicle excision and extensive debridement.    PLAN:   We had a detailed discussion outlining the etiology, anatomy, pathophysiology, and natural history of superior labral and biceps tendon pathology of the shoulder. Imaging was reviewed in detail and correlated to a 3-dimensional model of the shoulder.     I had a lengthy discussion with Marilyn about the diagnosis and options, both surgical and nonsurgical. I have recommended that we proceed with arthroscopic biceps tenodesis and likely subacromial decompression as we agree surgical intervention would likely offer the best opportunity for symptomatic relief and functional recovery. I used diagrams, imaging studies, and a 3-dimensional model to outline her  pathology, as well as general surgical principles. We reviewed the risks associated with shoulder arthroscopy.   In particular we discussed risks that include, but are not limited to infection, blood loss, potential transient or permanent injury to nerves or blood vessels, joint stiffness, persistent pain, need for future operation, failure of healing, wound complications, failure of therapeutic intervention, risk of re-injury, fixation failure, deep vein thrombosis and pulmonary embolism. We discussed the proposed rehabilitation timeline as well as expected postoperative restrictions.     Most post-surgical patients after arthroscopic biceps tenodesis utilize a shoulder immobilizer/sling for approximately ~2 weeks.  Physical therapy is initiated immediately postsurgically with initial passive range of motion, followed by active assisted range of motion, and ultimately active range of motion after the 4 to 6-week melba.  After the 3-month melba postsurgically the restrictions are lifted and continued strengthening is recommended.    Marilyn voiced a good understanding of treatment options, risks and benefits, postoperative instructions, rehabilitation timeline, and restrictions. She was given the opportunity to ask questions, which were all answered to the best of my ability and to her satisfaction. Marilyn will work with my office to arrange a time for surgery and obtain any medical clearance information necessary. My pre-operative information packet, which details the process and answers many FAQ's will be provided. She was encouraged to call the office with any further questions or concerns.  I spent 45 minutes in preparation to see the patient, counseling/education of relevant pathology, discussing imaging results, surgical counseling, DME fitting, and care coordination.      FOLLOW-UP:   Post-Operative Visit, POD 6 with MADHU Fish MD  Knee, Shoulder, & Elbow Surgery / Sports  Medicine Specialist  Orthopaedic Surgery  [address] [place] [address]   [email or web address]  [email or web address]  t: [phone]  o: [phone]  f: [phone]    This note was dictated using Dragon software.  While it was briefly proofread prior to completion, some grammatical, spelling, and word choice errors due to dictation may still occur.

## 2024-04-04 ENCOUNTER — TELEPHONE (OUTPATIENT)
Dept: ORTHOPEDICS CLINIC | Facility: CLINIC | Age: 74
End: 2024-04-04

## 2024-04-04 DIAGNOSIS — S43.431A SUPERIOR GLENOID LABRUM LESION OF RIGHT SHOULDER, INITIAL ENCOUNTER: Primary | ICD-10-CM

## 2024-04-04 NOTE — TELEPHONE ENCOUNTER
Patient called and wanted to set a surgery date w/ Dr. Taveras for her RT Shoulder. Please call patient for next available appt. Thanks.    Patient may be reached at [phone]

## 2024-04-04 NOTE — TELEPHONE ENCOUNTER
SPOKE WITH PATIENT AND WE SCHEDULED SURGERY, POST OP AND WENT OVER PRE OPERATIVE PROCEDURES. ALL QUESTIONS ANSWERED    PCP CLEARANCE SENT    PATIENT REQUESTING [facility]

## 2024-04-04 NOTE — TELEPHONE ENCOUNTER
Date of Surgery:    2024    Post Op Appt:  6/3/2024 9AM    Case ID: [id number]     Notes: PATIENT WANTS [facility]             OR BOOKING SHEET SHOULDER  Location: [x] [facility]                    [x] [facility]  Name: Marilyn Pichardo  MRN: [id number]   : 1950  Diagnos  [x] Superior glenoid labrum lesion of right shoulder, initial encounter [S43.115A]  Disposition:    [x] Ambulatory  [] Overnight for ARMAAN  [] Overnight for observation and pain control  [] Inpatient procedure     Operative Time Required:  2 hours ([facility])   Antibiotics: 2 g cefazolin within 60 minutes of surgical incision  Procedure:   Laterality:                  [x] RIGHT                  [] LEFT                   [] BILATERAL  Procedures:                    [x] Shoulder Arthroscopy                                 [] Rotator Cuff Repair (41437)  [x] Arthroscopic Biceps Tenodesis (53026)  [x] Subacromial Decompression (79986)  [x] Distal Clavicle Excision (66739)     [] SLAP repair (19541)  [] Capsulorraphy / Bankart (27228)     Additional info:   [] PCP Clearance Needed  [] MRSA  [] C-Arm  [x] TXA at time surgery  [x] Physical Therapy External D P T Modesta Rosibel  [x] DME Rx Needed  [] Appt with Dr. Taveras needed  Implants needed: Arthrex  Positioning Equipment: Beach Chair Setup, Steve

## 2024-04-05 ENCOUNTER — OFFICE VISIT (OUTPATIENT)
Dept: ORTHOPEDICS CLINIC | Facility: CLINIC | Age: 74
End: 2024-04-05
Payer: MEDICARE

## 2024-04-05 VITALS — WEIGHT: 170 LBS | BODY MASS INDEX: 28.32 KG/M2 | HEIGHT: 65 IN

## 2024-04-05 DIAGNOSIS — M54.41 CHRONIC RIGHT-SIDED LOW BACK PAIN WITH RIGHT-SIDED SCIATICA: Primary | ICD-10-CM

## 2024-04-05 DIAGNOSIS — G89.29 CHRONIC RIGHT-SIDED LOW BACK PAIN WITH RIGHT-SIDED SCIATICA: Primary | ICD-10-CM

## 2024-04-05 PROCEDURE — 99214 OFFICE O/P EST MOD 30 MIN: CPT | Performed by: STUDENT IN AN ORGANIZED HEALTH CARE EDUCATION/TRAINING PROGRAM

## 2024-04-05 RX ORDER — BENZONATATE 200 MG/1
200 CAPSULE ORAL 3 TIMES DAILY PRN
COMMUNITY
Start: 2024-03-21

## 2024-04-05 RX ORDER — ESCITALOPRAM OXALATE 10 MG/1
10 TABLET ORAL EVERY EVENING
COMMUNITY
Start: 2023-12-20

## 2024-04-05 RX ORDER — PROPRANOLOL HYDROCHLORIDE 40 MG/1
40 TABLET ORAL 2 TIMES DAILY
COMMUNITY
Start: 2024-02-23

## 2024-04-05 RX ORDER — DOXYCYCLINE HYCLATE 100 MG
100 TABLET ORAL 2 TIMES DAILY
COMMUNITY
Start: 2024-03-25 | End: 2024-04-08

## 2024-04-05 NOTE — PROGRESS NOTES
[facility] - ORTHOPEDICS  [address] [place] [address], [address], [place] [address]  [address] [place] [address]  [phone]     FOLLOW-UP PATIENT VISIT    Name: Marilyn Pichardo   MRN: [id number]  Date: 4/5/2024     CC: back pain      INTERVAL HISTORY:   Marilyn Pichardo is a 74 year old female  follow-up patient whom I have been treating conservatively. Patient returns today for reevaluation of back pain.     Per 2/13 note, \"Patient returns today for reevaluation of right sided low back pain with right radicular pain through the right buttock and laterally to the right greater troch area. Intermittent numbness of the right sole of foot and right toes. Has had multiple surgeries on the right foot. Has known gait change attributed to leg length discrepancy since ARTHUR in 2019, wears a shoe lift in the left shoe. Pain in the back has continued since 7/2023 after attempting to lift her spouse when he fell. \"    Patient was referred to Dr. Lorenzo for right greater troch injection.  Patient has had significant improvement in symptoms.  Patient has pain near her SI joint on the right    Bowel and bladder symptoms: absent.    We have tried the following interventions thus far: injection therapy    ROS: No fever/chills or other constitutional issues.    PE:   Vitals:    04/05/24 1020   Weight: 170 lb (77.1 kg)   Height: 5' 5\" (1.651 m)     Estimated body mass index is 28.29 kg/m² as calculated from the following:    Height as of this encounter: 5' 5\" (1.651 m).    Weight as of this encounter: 170 lb (77.1 kg).    On physical examination, she is awake, alert and oriented x 3 and in no acute distress. Mood, affect and language are normal. She appears well developed and well nourished.  She walks without a nonantalgic, nonmyelopathic, non-Trendelenburg gait. Motor strength testing of the lower extremities shows 5/5 strength in hip flexors, knee extensors, ankle dorsiflexors, toe extensor, and  gastroc-soleus complex.   Sensation is intact to light touch L2-S1 distributions bilaterally. Reflexes normal.     Radiographic Examination/Diagnostics:  MRI personally viewed, independently interpreted and radiology report was reviewed.  MRI of the lumbar spine demonstrates mild disc bulge at L4-5 with mild canal recess stenosis    IMPRESSION: Marilyn Pichardo is a 74 year old female with right-sided low back pain, no evidence of significant neurocompression    PLAN:   - Follow up w/ Dr. Lorenzo for right SI joint injection    I spent 30 minutes in preparation to see the patient, counseling/education of relevant pathology, discussing imaging results, ordering medication/therapy intervention, and care coordination.        Juan Luis Trinidad MD  Orthopedic Spine Surgeon  [facility]   [address] [place] [address], [address] [place] [address].[facility]  [email or web address]  t: [phone]   f: [phone]        This note was dictated using Dragon software.  While it was briefly proofread prior to completion, some grammatical, spelling, and word choice errors due to dictation may still occur.

## 2024-04-11 ENCOUNTER — OFFICE VISIT (OUTPATIENT)
Dept: ORTHOPEDICS CLINIC | Facility: CLINIC | Age: 74
End: 2024-04-11
Payer: MEDICARE

## 2024-04-11 DIAGNOSIS — M47.818 ARTHROPATHY OF RIGHT SACROILIAC JOINT: Primary | ICD-10-CM

## 2024-04-11 DIAGNOSIS — M60.861 OTHER MYOSITIS OF RIGHT LOWER EXTREMITY: ICD-10-CM

## 2024-04-11 DIAGNOSIS — G89.29 CHRONIC RIGHT-SIDED LOW BACK PAIN WITH RIGHT-SIDED SCIATICA: ICD-10-CM

## 2024-04-11 DIAGNOSIS — M54.41 CHRONIC RIGHT-SIDED LOW BACK PAIN WITH RIGHT-SIDED SCIATICA: ICD-10-CM

## 2024-04-11 RX ORDER — TRIAMCINOLONE ACETONIDE 40 MG/ML
40 INJECTION, SUSPENSION INTRA-ARTICULAR; INTRAMUSCULAR ONCE
Status: COMPLETED | OUTPATIENT
Start: 2024-04-11 | End: 2024-04-11

## 2024-04-11 RX ORDER — KETOROLAC TROMETHAMINE 30 MG/ML
30 INJECTION, SOLUTION INTRAMUSCULAR; INTRAVENOUS ONCE
Status: COMPLETED | OUTPATIENT
Start: 2024-04-11 | End: 2024-04-11

## 2024-04-11 RX ADMIN — KETOROLAC TROMETHAMINE 30 MG: 30 INJECTION, SOLUTION INTRAMUSCULAR; INTRAVENOUS at 15:42:00

## 2024-04-11 RX ADMIN — TRIAMCINOLONE ACETONIDE 40 MG: 40 INJECTION, SUSPENSION INTRA-ARTICULAR; INTRAMUSCULAR at 15:42:00

## 2024-04-15 NOTE — PROGRESS NOTES
Sports Medicine Clinic Note    Subjective:    Chief Complaint: Referred by Dr. Trinidad for right SI joint injection    Interval History: 74-year-old female, familiar to me for previously treated right trochanteric pain syndrome which responded well to injection therapy, returns today for right sacroiliac joint injection after seeing my colleague Dr. Trinidad on 4/5/2024 and the SI joint was thought to be a pain generator for her. Notes persistence of discomfort primarily localized to the right SI joint area. Marilyn describes the pain as a dull ache that intensifies with prolonged standing or walking. No new symptoms have developed since the last visit with Dr. Trinidad. She denies any recent trauma or exacerbating incidents.    Objective:    Right Lower Back Examination:    Inspection:  No visible swelling or erythema over the SI joint area.  Maintained posture with a slight favoring of the right side.    Palpation:  Tenderness elicited upon palpation of the right SI joint.  No palpable warmth or abnormal masses.    Range of Motion:  Lumbar flexion and extension within normal limits but reported pain at end-range of motion especially during extension.  Lateral bending causes discomfort on the right side.    Neurovascular:  Sensation intact to light touch across L2-S1 distributions bilaterally.  Deep tendon reflexes are symmetrical and within normal limits.    Special Tests:  SOLEDAD test elicits pain at the right SI joint.  FADIR test elicits pain at the right groin region  No signs of instability but pain is present on stress testing of the right SI joint.    Diagnostic Tests:    Review of the MRI of the lumbar spine from the previous visit indicates mild degenerative changes but no significant neural compression.    Assessment:    Chronic Right-Sided Low Back Pain with a focus on SI joint discomfort.    Plan:    Procedures: SI joint injection today, see procedure note below.    Additional imaging/workup: Consider hip imaging in  the future to investigate other causes of right lower extremity pain if fails to respond well to today's SI joint injection.    Therapy: Initiate physical therapy focusing on strengthening and stabilizing the lumbar and pelvic regions to alleviate stress on the SI joint. Deferred for the time being.    Medications: Continue Tylenol Max, avoid any prolonged NSAID use with mild CKD. Consider adding a muscle relaxant for short-term use to manage muscle spasms.    Bracing/Casting: Consider an SI belt for additional support and to potentially alleviate pain during activities.    Activity Recommendations: Advise modifying activities that exacerbate symptoms, such as minimizing prolonged standing and heavy lifting. Encourage regular breaks and gentle stretching throughout the day.    Follow-Up: Schedule for follow-up in 2-4 weeks, or sooner if symptoms escalate or new symptoms arise.    Ultrasound Guided Procedure Note:    After discussion of the risks and benefits, the patient elected to proceed with a therapeutic injection into the right sacroiliac joint under US guidance. Confirmed that the patient does not have history of prior adverse reactions, active infections, or relevant allergies. There was no erythema or warmth, and the skin was clear.    The skin was sterilized with ChloraPrep. A 22 gauge needle was inserted via inferolateral approach utilizing US for needle guidance and placement. The site was injected with a mixture of 1 mL of kenalog 40 mg/mL, 1 mL of Toradol 30 mg/mL, and 1 mL of 1% Lidocaine without Epinephrine. The injection was completed without complication, and a bandage was applied.    The patient tolerated the procedure well and was instructed to avoid strenuous activity for the next 24-48 hours and to use ice or Tylenol for pain as needed. The patient will call immediately with any signs of infection or allergic reaction.    Post-Injection Care: The patient tolerated the procedure well. An  occlusive bandage was placed over the injection site. Post-injection care instructions provided to the patient. The patient was asked to avoid strenuous activity and continue to rest the area for 2-3 days before resuming regular activities. Patient advised that the area may be more painful for the first 1-2 days. They can use ice or Tylenol for pain as needed.  Patient was instructed to watch for fever, increased swelling, or persistent pain. The patient will call immediately with any signs of infection or allergic reaction.    Complications: The patient tolerated the procedure well without any complications.        Dre Lorenzo DO, CAQSM   Primary Care Sports Medicine    Department of Orthopaedic Surgery  [address] [address]   [address] [place] [address]    t: [phone]  f: [phone]      [facility]

## 2024-04-25 ENCOUNTER — TELEPHONE (OUTPATIENT)
Dept: ORTHOPEDICS CLINIC | Facility: CLINIC | Age: 74
End: 2024-04-25
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
## 2024-04-25 NOTE — TELEPHONE ENCOUNTER
Patient is calling with a condition update after her SI joint injection done by Dr. Lorenzo.    Patient states the pain has worsened. Patient had injection 04.11.2024    Patient would like to know what to do for pain management next.    Thank you!     Telephone Information:   Home Phone [phone]   Work Phone [phone]   Mobile [phone]

## 2024-05-06 NOTE — TELEPHONE ENCOUNTER
RESCHEDULED PER PATIENT    Date of Surgery:    6/25/2024    Post Op Appt:  7/1/2024 10AM    NEW PCP CLEARANCE SENT

## 2024-05-09 ENCOUNTER — OFFICE VISIT (OUTPATIENT)
Dept: ORTHOPEDICS CLINIC | Facility: CLINIC | Age: 74
End: 2024-05-09
Payer: MEDICARE

## 2024-05-09 DIAGNOSIS — M47.818 ARTHROPATHY OF RIGHT SACROILIAC JOINT: Primary | ICD-10-CM

## 2024-05-09 PROCEDURE — 99214 OFFICE O/P EST MOD 30 MIN: CPT | Performed by: FAMILY MEDICINE
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
## 2024-05-13 RX ORDER — CYCLOBENZAPRINE HCL 10 MG
10 TABLET ORAL NIGHTLY
Qty: 20 TABLET | Refills: 0 | Status: SHIPPED | OUTPATIENT
Start: 2024-05-13 | End: 2024-06-02

## 2024-05-13 RX ORDER — TRIAMCINOLONE ACETONIDE 40 MG/ML
40 INJECTION, SUSPENSION INTRA-ARTICULAR; INTRAMUSCULAR ONCE
Status: DISCONTINUED | OUTPATIENT
Start: 2024-05-13 | End: 2024-05-13

## 2024-05-30 ENCOUNTER — OFFICE VISIT (OUTPATIENT)
Dept: ORTHOPEDICS CLINIC | Facility: CLINIC | Age: 74
End: 2024-05-30
Payer: MEDICARE

## 2024-05-30 DIAGNOSIS — M60.88 OTHER MYOSITIS OF OTHER SITE: Primary | ICD-10-CM

## 2024-05-30 DIAGNOSIS — M47.818 ARTHROPATHY OF RIGHT SACROILIAC JOINT: ICD-10-CM

## 2024-05-30 DIAGNOSIS — G89.29 CHRONIC RIGHT-SIDED LOW BACK PAIN WITH RIGHT-SIDED SCIATICA: ICD-10-CM

## 2024-05-30 DIAGNOSIS — M54.41 CHRONIC RIGHT-SIDED LOW BACK PAIN WITH RIGHT-SIDED SCIATICA: ICD-10-CM

## 2024-05-30 DIAGNOSIS — M60.861 OTHER MYOSITIS OF RIGHT LOWER EXTREMITY: ICD-10-CM

## 2024-05-30 RX ORDER — BETAMETHASONE SODIUM PHOSPHATE AND BETAMETHASONE ACETATE 3; 3 MG/ML; MG/ML
6 INJECTION, SUSPENSION INTRA-ARTICULAR; INTRALESIONAL; INTRAMUSCULAR; SOFT TISSUE ONCE
Status: COMPLETED | OUTPATIENT
Start: 2024-05-30 | End: 2024-05-30

## 2024-05-30 RX ADMIN — BETAMETHASONE SODIUM PHOSPHATE AND BETAMETHASONE ACETATE 6 MG: 3; 3 INJECTION, SUSPENSION INTRA-ARTICULAR; INTRALESIONAL; INTRAMUSCULAR; SOFT TISSUE at 15:23:00
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
## 2024-06-07 RX ORDER — ATORVASTATIN CALCIUM 40 MG/1
40 TABLET, FILM COATED ORAL NIGHTLY
COMMUNITY

## 2024-06-07 RX ORDER — PROPRANOLOL HYDROCHLORIDE 40 MG/1
40 TABLET ORAL 2 TIMES DAILY
COMMUNITY

## 2024-06-07 NOTE — PAT NURSING NOTE
PreOp Instructions     You are scheduled for: a Cardiac Procedure     Date of Procedure: 06/13/24     Diet Instructions: Do not eat or drink anything after midnight     Medications: Take Aspirin 81 mg x 4 tablets the day of your procedure, Medications you are allowed to take can be taken with a sip of water the morning of your procedure     Medications to Stop: Hold herbal supplements and vitamins morning of procedure     Skin Prep: Shower with antibacterial soap using a clean washcloth, prior to procedure     Arrival Time: The day prior to your procedure you will receive a phone call before 6:00 pm with your arrival time. If you haven't received a phone call, please check your voicemail messages., If you did not receive a voice mail and it is after 6:00 pm, please call the nursing supervisor at [phone].    Driving After Procedure: If sedation is given, you WILL NOT be able to drive home. You will need a responsible adult  to drive you home.     Discharge Teaching: Your nurse will give you specific instructions before discharge, Most people can resume normal activities in 2-3 days, Any questions, please call the physician's office

## 2024-06-07 NOTE — TELEPHONE ENCOUNTER
Patient called to cancel surgery. Patient will need to wait around 6 months due to needing a stent put in.    Patient will call back to reschedule    Surgery canceled

## 2024-06-13 ENCOUNTER — HOSPITAL ENCOUNTER (OUTPATIENT)
Dept: INTERVENTIONAL RADIOLOGY/VASCULAR | Facility: HOSPITAL | Age: 74
Discharge: HOME | End: 2024-06-13
Attending: INTERNAL MEDICINE
Payer: MEDICARE

## 2024-06-17 NOTE — PAT NURSING NOTE
Signed         PreOp Instructions     You are scheduled for: a Cardiac Procedure     Date of Procedure: 06/19/24     Diet Instructions: Do not eat or drink anything after midnight     Medications: Take Aspirin 81 mg x 4 tablets the day of your procedure, Medications you are allowed to take can be taken with a sip of water the morning of your procedure     Medications to Stop: Hold herbal supplements and vitamins morning of procedure     Skin Prep: Shower with antibacterial soap using a clean washcloth, prior to procedure     Arrival Time: The day prior to your procedure you will receive a phone call before 6:00 pm with your arrival time. If you haven't received a phone call, please check your voicemail messages., If you did not receive a voice mail and it is after 6:00 pm, please call the nursing supervisor at [phone].    Driving After Procedure: If sedation is given, you WILL NOT be able to drive home. You will need a responsible adult  to drive you home.     Discharge Teaching: Your nurse will give you specific instructions before discharge, Most people can resume normal activities in 2-3 days, Any questions, please call the physician's office     You will receive a call on Tuesday afternoon before 6:00 pm with your time of arrival.

## 2024-06-19 ENCOUNTER — HOSPITAL ENCOUNTER (OUTPATIENT)
Dept: INTERVENTIONAL RADIOLOGY/VASCULAR | Facility: HOSPITAL | Age: 74
Discharge: HOME | End: 2024-06-19
Attending: INTERNAL MEDICINE | Admitting: INTERNAL MEDICINE

## 2024-06-19 VITALS
TEMPERATURE: 96 F | DIASTOLIC BLOOD PRESSURE: 65 MMHG | SYSTOLIC BLOOD PRESSURE: 112 MMHG | OXYGEN SATURATION: 99 % | RESPIRATION RATE: 15 BRPM | HEART RATE: 61 BPM

## 2024-06-19 DIAGNOSIS — R93.1 ABNORMAL ECHOCARDIOGRAM: ICD-10-CM

## 2024-06-19 DIAGNOSIS — R07.9 CHEST PAIN: ICD-10-CM

## 2024-06-19 LAB — ISTAT ACTIVATED CLOTTING TIME: 275 SECONDS (ref 74–137)

## 2024-06-19 PROCEDURE — 99153 MOD SED SAME PHYS/QHP EA: CPT | Performed by: INTERNAL MEDICINE

## 2024-06-19 PROCEDURE — 93458 L HRT ARTERY/VENTRICLE ANGIO: CPT | Performed by: INTERNAL MEDICINE

## 2024-06-19 PROCEDURE — B2151ZZ FLUOROSCOPY OF LEFT HEART USING LOW OSMOLAR CONTRAST: ICD-10-PCS | Performed by: INTERNAL MEDICINE

## 2024-06-19 PROCEDURE — 85347 COAGULATION TIME ACTIVATED: CPT

## 2024-06-19 PROCEDURE — 4A023N7 MEASUREMENT OF CARDIAC SAMPLING AND PRESSURE, LEFT HEART, PERCUTANEOUS APPROACH: ICD-10-PCS | Performed by: INTERNAL MEDICINE

## 2024-06-19 PROCEDURE — 99152 MOD SED SAME PHYS/QHP 5/>YRS: CPT | Performed by: INTERNAL MEDICINE

## 2024-06-19 PROCEDURE — 99211 OFF/OP EST MAY X REQ PHY/QHP: CPT

## 2024-06-19 PROCEDURE — B240ZZ3 ULTRASONOGRAPHY OF SINGLE CORONARY ARTERY, INTRAVASCULAR: ICD-10-PCS | Performed by: INTERNAL MEDICINE

## 2024-06-19 PROCEDURE — 92978 ENDOLUMINL IVUS OCT C 1ST: CPT | Performed by: INTERNAL MEDICINE

## 2024-06-19 PROCEDURE — 027034Z DILATION OF CORONARY ARTERY, ONE ARTERY WITH DRUG-ELUTING INTRALUMINAL DEVICE, PERCUTANEOUS APPROACH: ICD-10-PCS | Performed by: INTERNAL MEDICINE

## 2024-06-19 PROCEDURE — B2111ZZ FLUOROSCOPY OF MULTIPLE CORONARY ARTERIES USING LOW OSMOLAR CONTRAST: ICD-10-PCS | Performed by: INTERNAL MEDICINE

## 2024-06-19 RX ORDER — ASPIRIN 81 MG/1
81 TABLET ORAL DAILY
Qty: 30 TABLET | Refills: 0 | Status: SHIPPED | OUTPATIENT
Start: 2024-06-20

## 2024-06-19 RX ORDER — NITROGLYCERIN 20 MG/100ML
INJECTION INTRAVENOUS
Status: COMPLETED
Start: 2024-06-19 | End: 2024-06-19

## 2024-06-19 RX ORDER — SODIUM CHLORIDE 9 MG/ML
INJECTION, SOLUTION INTRAVENOUS CONTINUOUS
Status: DISCONTINUED | OUTPATIENT
Start: 2024-06-19 | End: 2024-06-19

## 2024-06-19 RX ORDER — MIDAZOLAM HYDROCHLORIDE 1 MG/ML
INJECTION INTRAMUSCULAR; INTRAVENOUS
Status: COMPLETED
Start: 2024-06-19 | End: 2024-06-19

## 2024-06-19 RX ORDER — LIDOCAINE HYDROCHLORIDE 10 MG/ML
INJECTION, SOLUTION EPIDURAL; INFILTRATION; INTRACAUDAL; PERINEURAL
Status: COMPLETED
Start: 2024-06-19 | End: 2024-06-19

## 2024-06-19 RX ORDER — ASPIRIN 81 MG/1
81 TABLET ORAL DAILY
Status: DISCONTINUED | OUTPATIENT
Start: 2024-06-20 | End: 2024-06-19

## 2024-06-19 RX ORDER — VERAPAMIL HYDROCHLORIDE 2.5 MG/ML
INJECTION, SOLUTION INTRAVENOUS
Status: COMPLETED
Start: 2024-06-19 | End: 2024-06-19

## 2024-06-19 RX ORDER — HEPARIN SODIUM 5000 [USP'U]/ML
INJECTION, SOLUTION INTRAVENOUS; SUBCUTANEOUS
Status: COMPLETED
Start: 2024-06-19 | End: 2024-06-19

## 2024-06-19 RX ORDER — SODIUM CHLORIDE 9 MG/ML
INJECTION, SOLUTION INTRAVENOUS
Status: DISCONTINUED | OUTPATIENT
Start: 2024-06-20 | End: 2024-06-19 | Stop reason: HOSPADM

## 2024-06-19 RX ORDER — ACETAMINOPHEN 325 MG/1
TABLET ORAL
Status: COMPLETED
Start: 2024-06-19 | End: 2024-06-19

## 2024-06-19 NOTE — PROCEDURES
[facility]       Marilyn Pichardo Location: Cath Lab    CSN [id number] MRN [id number]   Admission Date 6/19/2024 Procedure Date 6/19/2024   Attending Physician Dayron Arnold MD Procedure Physician Bran Tubbs MD         CARDIAC CATHETERIZATION/PERCUTANEOUS CORONARY INTERVENTION REPORT     PREOPERATIVE DIAGNOSIS:  chest pain, abnormal CTA suggesting ostial RCA stenosis  POSTOPERATIVE DIAGNOSIS:  obstructive RCA stenosis s/p IVUS guided PCI  PROCEDURE PERFORMED:  left heart catheterization, left ventriculogram, selective coronary angiography, IVUS guided PCI to the RCA      PROCEDURE:  The patient was brought to the cardiac catheterization lab in the fasting state.  Informed consent was obtained.  Moderate sedation was employed using a total of IV Versed 5mg and IV fentanyl 200mcg.  I directly observed the patient from 0810 to 0931, for a total of 81 minutes, and an independent trained observer was present and assisted in the monitoring of the patient's level of consciousness and physiological status, watching the heart rate, blood pressure, oximetry, and rhythm, in addition to total moderation time.      ACCESS/CATHETER PLACEMENT:   The right radial area was prepped and draped in a sterile manner and anesthetized with 2% lidocaine.  The right radial artery was accessed, and a 6-Citizen of [place], 11 cm sheath was placed.  Left and right selective coronary angiography was performed using a 6F Tiger4.0 catheter.  A pigtail catheter was used to cross the aortic valve, measure left ventricular pressure and perform a 30 degree OLSEN ventriculogram.  The catheter was pulled across the valve to assess for aortic stenosis.  At the conclusion of the study, the right radial artery hemostasis was performed using a radial band.         FINDINGS:      1.  Left heart catheterization:    Left ventricle: 112/15 mmHg   Aorta: 99/39/70 mmHg  Left ventriculogram demonstrated a LV ejection fraction of 60-65% without significant mitral  regurgitation; there are no regional wall motion abnormalities.  There was no aortic stenosis upon pullback of the catheter.    2.  Selective coronary angiography:      Left main artery: The left main artery is a medium caliber, bifurcating vessel without significant angiographic disease.      LAD:  The left anterior descending artery is a medium size vessel that wraps around the apex and gives rise to two small diagonals.  The mid LAD demonstrates mild non-obstructive angiographic narrowing.      LCx: The left circumflex artery is a medium size vessel that give rise to two obtuse marginals.  The mid LCx demonstrates a 40% tubular stenosis.      RCA:  The right coronary artery is a medium size, dominant vessel that gives rise to a medium rPDA.  The ostial RCA demonstrates a tight 90% stenosis leading into the proximal segment that gives rise to a 50-60% moderate plaque.      INTERVENTIONAL PROCEDURE: Heparin was used for systemic anticoagulation, confirmed therapeutic by ACT measurement.  The RCA was engaged with a 6F JR4 guide catheter.  IVUS was performed (Thinkglue Tom Green Eye); the distal reference vessel measured 3.5mm intimally (4.0-4.5mm adventitially).  Next, the lesion was serially pre-dilated with a 2.5mm and 3.0mm balloons.  A 6F Guidezilla extension catheter was used to navigate the distal stent edge past a bend in the artery, and a 3.5x38mm Xience Skypoint ANTONIA was deployed and post-dilated to high pressure with a 3.5mm NC balloon, flaring the ostium. The stent appeared well apposed/expanded on repeat IVUS; the stent protruded from the ostium a couple millimeters; completion angiography demonstrated no residual stenosis, TIMI3 distal flow.  In case of need of repeat angiography in the future, we re-trial'ed engagement of the ostial RCA, which proved more difficult after stent deployment due to a few millimeter protrusion from the ostium.  We found best success with an AL1 catheter with the tip pointing down,  while the patient took a deep breath. Once engaged, a guidewire crossed into the stent without difficulty.       MEDICATIONS:  See nursing record.     COMPLICATIONS:  No major complications were observed during this visit to the catheterization lab.     IMPRESSION:    1.  Left heart catheterization: LVEDP 15mmHg, no aortic stenosis.  LVEF 60-65% without wall motion abnormalities.  No significant mitral regurgitation  2.  Coronary angiography:  right dominant system  - LM:  no significant angiographic disease  - LAD: mild non-obstructive mid plaque   - LCx: 40% mid stenosis  - RCA: 90% ostial stenosis  3. Percutaneous coronary intervention:  Successful IVUS guided PCI to the ostial RCA with a 3.5x38mm Xience Skypoint ANTONIA.     RECOMMENDATIONS: DAPT (asa/ticagrelor) for a minimum of 3-6 months; ideally, indefinite therapy considering ostial position of stent. Considering the stent protrudes a few millimeters from the ostium, we found best success re-engaging the artery with an AL1 catheter with the tip pointing down, while the patient took a deep breath. Once engaged, a guidewire crossed into the stent without difficulty.

## 2024-06-19 NOTE — H&P
Patient seen and examined independently. H and P by Dr. Arnold dated 6/7/24 reviewed. No changes in H and P. Risks and benefits of procedure were discussed with patient. Airway examined.  Patient is ASA class 2 and mallampati class 2. Pt is appropriate for conscious sedation. No history of difficult airway.    The risks, benefits, and alternatives of cardiac catheterization were discussed. The risks included, but were not limited to: bleeding, allergic reaction, infection, stroke, myocardial infarction (heart attack), and death. Benefits of the procedure included: symptomatic improvement, diagnosis of heart disease and prevention of myocardial infarction. Alternatives to the procedure included: not performing cardiac catheterization, treatment with medications only, and observation.        Appropriate candidate for Sedation/Analgesia: Yes  Plan for Sedation reviewed: Yes    Explained Anesthesia options and attendant risks, and have determined patient is an appropriate candidate. Yes  Consent for Sedation obtained: Yes  Patient reevaluated immediately prior to Sedation/Analgesia: Yes

## 2024-06-19 NOTE — PROGRESS NOTES
Pt post PCI. Pt awake, vss. Right radial access site is CDI with TR band in place. Pt does c/o pain radiating from right wrist to elbow. Some air released from TR band. Dr. Tubbs at bedside, notified of pain. Tylenol ordered. Heat pack applied. Will continue to monitor.     Right arm pain has subsided.    Recovery complete per protocol. Vss. Right radial site remains CDI after TR band removed. Discharge instructions reviewed, iv dc'd and pt discharged home with daughter driving.

## 2024-06-19 NOTE — DIETARY NOTE
Clinical Nutrition    Dietitian consult received per cardiac rehab standing order. Pt to be educated by cardiac rehab staff and encouraged to attend outpatient classes taught by RD. RD available PRN.    Mayelin Self MS, RD, LDN  Clinical Dietitian  Ext: [phone]

## 2024-06-19 NOTE — DISCHARGE INSTRUCTIONS
Your orientation appointment for cardiac rehabilitation is July, 23, 2024 at 10:00 am.  Please bring your order for cardiac rehabilitation from your cardiologist.  Check with your insurance company for coverage prior to your first visit.  Your cardiologist may require you to have stress test prior to starting cardiac rehab. Your appointment may last up to 1.25 hours.  If you have questions about cardiac rehabilitation please call [phone].       HOME CARE INSTRUCTIONS FOLLOWING CORONARY ANGIOGRAPHY,  PERIPHERAL ANGIOGRAPHY, ANGIOPLASTY (PTCA/PTA) OR INSERTION  OF STENT IN THE CORONARY, CAROTID, AND/OR PERIPHERAL ARTERIES      Activity:   DO NOT drive after the procedure. You may resume driving late the following day according to the nurse or physician’s instructions   Plan on resting and relaxing tonight and tomorrow   Resume your normal activity after 48 hours, or as instructed by your physician   Do not lift anything over 10 pounds for the next 24 hours   Avoid sexual activity for the next 24 hours   Avoid drinking alcohol for the next 24 hours   If the wrist was used, avoid bending/flexing of the wrist for the next 24 hours.       What is Normal?   A small lump at the procedure site associated with mild tenderness when touched   The procedure site may be bruised or discolored   There may be a small amount of drainage on the bandage    Special Instructions:   Drink plenty of fluids during the next 24 hours to “flush” the contrast from your system   The bandage is to remain in place for 24 hours   Keep the bandage clean and dry   DO NOT submerge the procedure site for 72 hours (no bath tubs or pools). This includes dishwashing/submersion of the wrist, if the wrist was used   After 24 hours, you must remove the bandage   You should shower after removing the bandage, and wash the procedure site gently with soap and water   If you choose to wear a bandage for a few days, make sure it remains clean and dry and  that it is changed daily    When you should NOTIFY YOUR PHYSICIAN:   Bleeding can occur at the procedure site - both on the outside of the skin and/or beneath the surface of the skin   Swelling or a large lump at the procedure site can occur, which may be accompanied by moderate to severe pain. If either of the above occurs, lie down flat. Have someone apply pressure to the procedure site with both hands, as instructed by the nurse. Hold pressure for 20 minutes and the bleeding should stop. Notify your physician of the occurrence. If the bleeding does not stop, call 911 and continue to apply pressure   If you experience signs of a fever, temperature >101 degrees, chills, infection (redness, swelling, thick yellow drainage, or a foul odor from the procedure site)   If you notice any numbness, tingling, or loss of feeling to your fingers or hand, if wrist access was utilized    If you Received a Stent:  - You will remain on an antiplatelet drug and/or aspirin. Antiplatelet medications are usually taken for six months to one year and should not be stopped unless your cardiologist directs you to do so. These medications help to prevent blockage at the stent site. If another physician or dentist asks you to stop your antiplatelet medication, you need to consult your cardiologist first. Together, your cardiologist and other physician can discuss the risks that may be involved if you are not taking the antiplatelet medication.   - If a MRI is necessary, it may be done 4-6 weeks after your procedure. Verify this with your cardiologist.  - Keep the stent card with you at all times! If you need a MRI in the future, your stent card will need to be shown to the technologist before performing the MRI. A duplicate card CANNOT be reproduced.     Other:  - You may resume your present diet, unless otherwise specified by your physician.   - You may resume all of your medications as prescribed, unless otherwise directed by your  physician. A list of your medications was provided to you at discharge.      TAKE A DOSE OF TICAGRELOR (BRILINTA) TONIGHT.

## 2024-06-19 NOTE — CARDIAC REHAB
CAD education completed with patient.  Stent card given to patient.  Patient referred to outpatient cardiac rehab at [place].

## 2024-06-23 ENCOUNTER — APPOINTMENT (OUTPATIENT)
Dept: GENERAL RADIOLOGY | Facility: HOSPITAL | Age: 74
End: 2024-06-23

## 2024-06-23 ENCOUNTER — HOSPITAL ENCOUNTER (EMERGENCY)
Facility: HOSPITAL | Age: 74
Discharge: HOME | End: 2024-06-23
Attending: EMERGENCY MEDICINE

## 2024-06-23 VITALS
BODY MASS INDEX: 33 KG/M2 | RESPIRATION RATE: 17 BRPM | OXYGEN SATURATION: 100 % | WEIGHT: 168 LBS | HEART RATE: 57 BPM | DIASTOLIC BLOOD PRESSURE: 70 MMHG | SYSTOLIC BLOOD PRESSURE: 149 MMHG

## 2024-06-23 DIAGNOSIS — R06.02 SHORTNESS OF BREATH: Primary | ICD-10-CM

## 2024-06-23 LAB
ALBUMIN SERPL-MCNC: 3.5 G/DL (ref 3.4–5)
ALBUMIN/GLOB SERPL: 1 {RATIO} (ref 1–2)
ALP LIVER SERPL-CCNC: 61 U/L
ALT SERPL-CCNC: 29 U/L
ANION GAP SERPL CALC-SCNC: 6 MMOL/L (ref 0–18)
AST SERPL-CCNC: 25 U/L (ref 15–37)
ATRIAL RATE: 62 BPM
BASOPHILS # BLD AUTO: 0.03 X10(3) UL (ref 0–0.2)
BASOPHILS NFR BLD AUTO: 0.6 %
BILIRUB SERPL-MCNC: 0.8 MG/DL (ref 0.1–2)
BUN BLD-MCNC: 20 MG/DL (ref 9–23)
CALCIUM BLD-MCNC: 9 MG/DL (ref 8.5–10.1)
CHLORIDE SERPL-SCNC: 111 MMOL/L (ref 98–112)
CHOLEST SERPL-MCNC: 178 MG/DL (ref ?–200)
CO2 SERPL-SCNC: 23 MMOL/L (ref 21–32)
CREAT BLD-MCNC: 1.21 MG/DL
EGFRCR SERPLBLD CKD-EPI 2021: 46 ML/MIN/1.73M2 (ref 60–?)
EOSINOPHIL # BLD AUTO: 0.21 X10(3) UL (ref 0–0.7)
EOSINOPHIL NFR BLD AUTO: 4.2 %
ERYTHROCYTE [DISTWIDTH] IN BLOOD BY AUTOMATED COUNT: 13.2 %
GLOBULIN PLAS-MCNC: 3.5 G/DL (ref 2.8–4.4)
GLUCOSE BLD-MCNC: 142 MG/DL (ref 70–99)
HCT VFR BLD AUTO: 40.5 %
HDLC SERPL-MCNC: 83 MG/DL (ref 40–59)
HGB BLD-MCNC: 13.8 G/DL
IMM GRANULOCYTES # BLD AUTO: 0.02 X10(3) UL (ref 0–1)
IMM GRANULOCYTES NFR BLD: 0.4 %
LDLC SERPL CALC-MCNC: 64 MG/DL (ref ?–100)
LYMPHOCYTES # BLD AUTO: 1.04 X10(3) UL (ref 1–4)
LYMPHOCYTES NFR BLD AUTO: 20.6 %
MCH RBC QN AUTO: 30.3 PG (ref 26–34)
MCHC RBC AUTO-ENTMCNC: 34.1 G/DL (ref 31–37)
MCV RBC AUTO: 88.8 FL
MONOCYTES # BLD AUTO: 0.3 X10(3) UL (ref 0.1–1)
MONOCYTES NFR BLD AUTO: 5.9 %
NEUTROPHILS # BLD AUTO: 3.46 X10 (3) UL (ref 1.5–7.7)
NEUTROPHILS # BLD AUTO: 3.46 X10(3) UL (ref 1.5–7.7)
NEUTROPHILS NFR BLD AUTO: 68.3 %
NONHDLC SERPL-MCNC: 95 MG/DL (ref ?–130)
OSMOLALITY SERPL CALC.SUM OF ELEC: 295 MOSM/KG (ref 275–295)
P AXIS: 55 DEGREES
P-R INTERVAL: 192 MS
PLATELET # BLD AUTO: 258 10(3)UL (ref 150–450)
POTASSIUM SERPL-SCNC: 4.1 MMOL/L (ref 3.5–5.1)
PROT SERPL-MCNC: 7 G/DL (ref 6.4–8.2)
Q-T INTERVAL: 420 MS
QRS DURATION: 82 MS
QTC CALCULATION (BEZET): 426 MS
R AXIS: 44 DEGREES
RBC # BLD AUTO: 4.56 X10(6)UL
SODIUM SERPL-SCNC: 140 MMOL/L (ref 136–145)
T AXIS: 29 DEGREES
TRIGL SERPL-MCNC: 194 MG/DL (ref 30–149)
TROPONIN I SERPL HS-MCNC: 100 NG/L
VENTRICULAR RATE: 62 BPM
VLDLC SERPL CALC-MCNC: 29 MG/DL (ref 0–30)
WBC # BLD AUTO: 5.1 X10(3) UL (ref 4–11)

## 2024-06-23 PROCEDURE — 85025 COMPLETE CBC W/AUTO DIFF WBC: CPT | Performed by: EMERGENCY MEDICINE

## 2024-06-23 PROCEDURE — 80053 COMPREHEN METABOLIC PANEL: CPT | Performed by: EMERGENCY MEDICINE

## 2024-06-23 PROCEDURE — 85025 COMPLETE CBC W/AUTO DIFF WBC: CPT

## 2024-06-23 PROCEDURE — 71045 X-RAY EXAM CHEST 1 VIEW: CPT

## 2024-06-23 PROCEDURE — 93010 ELECTROCARDIOGRAM REPORT: CPT

## 2024-06-23 PROCEDURE — 80053 COMPREHEN METABOLIC PANEL: CPT

## 2024-06-23 PROCEDURE — 80061 LIPID PANEL: CPT

## 2024-06-23 PROCEDURE — 84484 ASSAY OF TROPONIN QUANT: CPT | Performed by: EMERGENCY MEDICINE

## 2024-06-23 PROCEDURE — 36415 COLL VENOUS BLD VENIPUNCTURE: CPT

## 2024-06-23 PROCEDURE — 99284 EMERGENCY DEPT VISIT MOD MDM: CPT

## 2024-06-23 PROCEDURE — 99285 EMERGENCY DEPT VISIT HI MDM: CPT

## 2024-06-23 PROCEDURE — 84484 ASSAY OF TROPONIN QUANT: CPT

## 2024-06-23 PROCEDURE — 80061 LIPID PANEL: CPT | Performed by: EMERGENCY MEDICINE

## 2024-06-23 PROCEDURE — 93005 ELECTROCARDIOGRAM TRACING: CPT

## 2024-06-23 NOTE — ED PROVIDER NOTES
Patient Seen in: [facility] Emergency Department      History     Chief Complaint   Patient presents with    Chest Pain Angina     Stated Complaint: shortness of breath, cardiac stent placed the other day    Subjective:   HPI    74-year-old female presents for evaluation of difficulty breathing.  Patient has had vague difficulty breathing over the weekend but especially today while she was in [employer] this morning standing up.  Slight right-sided chest discomfort this morning associated with burping.  Patient had stent placed to her RCA last Wednesday via a right radial approach.  She was placed on Brilinta postop which she continues to take.  She was asymptomatic until about Friday.    Objective:   No pertinent past medical history.            No pertinent past surgical history.              No pertinent social history.            Review of Systems    Positive for stated complaint: shortness of breath, cardiac stent placed the other day  Other systems are as noted in HPI.  Constitutional and vital signs reviewed.      All other systems reviewed and negative except as noted above.    Physical Exam     ED Triage Vitals   BP 06/23/24 1423 136/84   Pulse 06/23/24 1423 63   Resp 06/23/24 1423 18   Temp --    Temp src --    SpO2 06/23/24 1423 98 %   O2 Device 06/23/24 1513 None (Room air)       Current Vitals:   Vital Signs  BP: 136/77  Pulse: 61  Resp: 23  MAP (mmHg): 92    Oxygen Therapy  SpO2: 100 %  O2 Device: None (Room air)            Physical Exam    General: Alert, oriented, no apparent distress  HEENT:  Pupils equal reactive.  Extraocular motions intact. MMM.  Neck: Supple  Lungs: Clear to auscultation bilaterally.  Heart: Regular rate and rhythm.  Strong and equal radial pulses  Abdomen: Soft, nontender.   Skin: No rash.  No edema.  Neurologic: No focal neurologic deficits.  Normal speech pattern  Musculoskeletal: No tenderness or deformity noted.  Full range of motion throughout.        ED Course     Labs  Reviewed   COMP METABOLIC PANEL (14) - Abnormal; Notable for the following components:       Result Value    Glucose 142 (*)     Creatinine 1.21 (*)     eGFR-Cr 46 (*)     All other components within normal limits   TROPONIN I HIGH SENSITIVITY - Abnormal; Notable for the following components:    Troponin I (High Sensitivity) 100 (*)     All other components within normal limits   LIPID PANEL - Abnormal; Notable for the following components:    HDL Cholesterol 83 (*)     Triglycerides 194 (*)     All other components within normal limits   CBC WITH DIFFERENTIAL WITH PLATELET    Narrative:     The following orders were created for panel order CBC With Differential With Platelet.  Procedure                               Abnormality         Status                     ---------                               -----------         ------                     CBC W/ DIFFERENTIAL[[id number]]                              Final result                 Please view results for these tests on the individual orders.   RAINBOW DRAW BLUE   CBC W/ DIFFERENTIAL     EKG    Rate, intervals and axes as noted on EKG Report.  Rate: 62  Rhythm: Sinus Rhythm  Reading: no ST elevation or depression, similar to 2021                          MDM      Differential diagnosis includes pulmonary edema, metabolic disturbance, occluded stent      I have independently visualized the radiology images, my focus/limited interpretation: No pulmonary edema or pneumothorax    Defer to radiologist for other/incidental findings        Chemistry with normal electrolytes.  Creatinine slightly elevated at 1.2.    Troponin mildly elevated at 100  (EKG without changes as above)         Spoke with Dr Alvarado.  Thinks the trivial elevation in troponin is likely related to recent procedure and that the patient can be discharged to follow-up with Dr. Tubbs.    The patient took several trips to the bathroom by herself and was asymptomatic.  She and her daughter are agreeable to  discharge plan and will call Dr. Tubbs tomorrow.  If symptoms worsen or new symptoms develop, return here sooner            Medical Decision Making  Amount and/or Complexity of Data Reviewed  Independent Historian: caregiver     Details: Daughter at bedside        Disposition and Plan     Clinical Impression:  1. Shortness of breath         Disposition:  Discharge  6/23/2024  4:22 pm    Follow-up:  Dayron Arnold MD  [address]  [address]  [place] [address] [phone]    Call in 1 day(s)            Medications Prescribed:  Current Discharge Medication List

## 2024-06-23 NOTE — ED INITIAL ASSESSMENT (HPI)
C/o of mild ANN stared on Friday with exertion, with tightness to chest, constant    Stent placed on Wednesday    no

## 2024-07-15 ENCOUNTER — ORDER TRANSCRIPTION (OUTPATIENT)
Dept: CARDIAC REHAB | Facility: HOSPITAL | Age: 74
End: 2024-07-15

## 2024-07-15 DIAGNOSIS — Z95.5 STENTED CORONARY ARTERY: Primary | ICD-10-CM

## 2024-07-23 ENCOUNTER — CARDPULM VISIT (OUTPATIENT)
Dept: CARDIAC REHAB | Facility: HOSPITAL | Age: 74
End: 2024-07-23
Attending: INTERNAL MEDICINE
Payer: MEDICARE

## 2024-07-23 VITALS — HEIGHT: 65 IN | WEIGHT: 165 LBS | BODY MASS INDEX: 27.49 KG/M2

## 2024-07-24 ENCOUNTER — CARDPULM VISIT (OUTPATIENT)
Dept: CARDIAC REHAB | Facility: HOSPITAL | Age: 74
End: 2024-07-24
Attending: INTERNAL MEDICINE
Payer: MEDICARE

## 2024-07-24 PROCEDURE — 93798 PHYS/QHP OP CAR RHAB W/ECG: CPT

## 2024-07-25 ENCOUNTER — APPOINTMENT (OUTPATIENT)
Dept: CARDIAC REHAB | Facility: HOSPITAL | Age: 74
End: 2024-07-25
Attending: INTERNAL MEDICINE
Payer: MEDICARE

## 2024-07-29 ENCOUNTER — APPOINTMENT (OUTPATIENT)
Dept: CARDIAC REHAB | Facility: HOSPITAL | Age: 74
End: 2024-07-29
Attending: INTERNAL MEDICINE
Payer: MEDICARE

## 2024-07-29 PROCEDURE — 93798 PHYS/QHP OP CAR RHAB W/ECG: CPT

## 2024-07-31 ENCOUNTER — CARDPULM VISIT (OUTPATIENT)
Dept: CARDIAC REHAB | Facility: HOSPITAL | Age: 74
End: 2024-07-31
Attending: INTERNAL MEDICINE
Payer: MEDICARE

## 2024-07-31 PROCEDURE — 93798 PHYS/QHP OP CAR RHAB W/ECG: CPT

## 2024-08-01 ENCOUNTER — APPOINTMENT (OUTPATIENT)
Dept: CARDIAC REHAB | Facility: HOSPITAL | Age: 74
End: 2024-08-01
Attending: INTERNAL MEDICINE
Payer: MEDICARE

## 2024-08-01 PROCEDURE — 93798 PHYS/QHP OP CAR RHAB W/ECG: CPT

## 2024-08-05 ENCOUNTER — APPOINTMENT (OUTPATIENT)
Dept: CARDIAC REHAB | Facility: HOSPITAL | Age: 74
End: 2024-08-05
Attending: INTERNAL MEDICINE
Payer: MEDICARE

## 2024-08-05 PROCEDURE — 93798 PHYS/QHP OP CAR RHAB W/ECG: CPT

## 2024-08-07 ENCOUNTER — CARDPULM VISIT (OUTPATIENT)
Dept: CARDIAC REHAB | Facility: HOSPITAL | Age: 74
End: 2024-08-07
Attending: INTERNAL MEDICINE
Payer: MEDICARE

## 2024-08-07 PROCEDURE — 93798 PHYS/QHP OP CAR RHAB W/ECG: CPT

## 2024-08-08 ENCOUNTER — APPOINTMENT (OUTPATIENT)
Dept: CARDIAC REHAB | Facility: HOSPITAL | Age: 74
End: 2024-08-08
Attending: INTERNAL MEDICINE
Payer: MEDICARE

## 2024-08-08 PROCEDURE — 93798 PHYS/QHP OP CAR RHAB W/ECG: CPT

## 2024-08-12 ENCOUNTER — APPOINTMENT (OUTPATIENT)
Dept: CARDIAC REHAB | Facility: HOSPITAL | Age: 74
End: 2024-08-12
Attending: INTERNAL MEDICINE
Payer: MEDICARE

## 2024-08-14 ENCOUNTER — CARDPULM VISIT (OUTPATIENT)
Dept: CARDIAC REHAB | Facility: HOSPITAL | Age: 74
End: 2024-08-14
Attending: INTERNAL MEDICINE
Payer: MEDICARE

## 2024-08-14 PROCEDURE — 93798 PHYS/QHP OP CAR RHAB W/ECG: CPT

## 2024-08-15 ENCOUNTER — CARDPULM VISIT (OUTPATIENT)
Dept: CARDIAC REHAB | Facility: HOSPITAL | Age: 74
End: 2024-08-15
Attending: INTERNAL MEDICINE
Payer: MEDICARE

## 2024-08-15 PROCEDURE — 93798 PHYS/QHP OP CAR RHAB W/ECG: CPT

## 2024-08-19 ENCOUNTER — APPOINTMENT (OUTPATIENT)
Dept: CARDIAC REHAB | Facility: HOSPITAL | Age: 74
End: 2024-08-19
Attending: INTERNAL MEDICINE
Payer: MEDICARE

## 2024-08-19 PROCEDURE — 93798 PHYS/QHP OP CAR RHAB W/ECG: CPT

## 2024-08-21 ENCOUNTER — CARDPULM VISIT (OUTPATIENT)
Dept: CARDIAC REHAB | Facility: HOSPITAL | Age: 74
End: 2024-08-21
Attending: INTERNAL MEDICINE
Payer: MEDICARE

## 2024-08-21 PROCEDURE — 93798 PHYS/QHP OP CAR RHAB W/ECG: CPT

## 2024-08-22 ENCOUNTER — APPOINTMENT (OUTPATIENT)
Dept: CARDIAC REHAB | Facility: HOSPITAL | Age: 74
End: 2024-08-22
Attending: INTERNAL MEDICINE
Payer: MEDICARE

## 2024-08-22 PROCEDURE — 93798 PHYS/QHP OP CAR RHAB W/ECG: CPT

## 2024-08-26 ENCOUNTER — APPOINTMENT (OUTPATIENT)
Dept: CARDIAC REHAB | Facility: HOSPITAL | Age: 74
End: 2024-08-26
Attending: INTERNAL MEDICINE
Payer: MEDICARE

## 2024-08-26 PROCEDURE — 93798 PHYS/QHP OP CAR RHAB W/ECG: CPT

## 2024-08-28 ENCOUNTER — CARDPULM VISIT (OUTPATIENT)
Dept: CARDIAC REHAB | Facility: HOSPITAL | Age: 74
End: 2024-08-28
Attending: INTERNAL MEDICINE
Payer: MEDICARE

## 2024-08-28 PROCEDURE — 93798 PHYS/QHP OP CAR RHAB W/ECG: CPT

## 2024-08-29 ENCOUNTER — APPOINTMENT (OUTPATIENT)
Dept: CARDIAC REHAB | Facility: HOSPITAL | Age: 74
End: 2024-08-29
Attending: INTERNAL MEDICINE
Payer: MEDICARE

## 2024-08-29 PROCEDURE — 93798 PHYS/QHP OP CAR RHAB W/ECG: CPT

## 2024-09-02 ENCOUNTER — APPOINTMENT (OUTPATIENT)
Dept: CARDIAC REHAB | Facility: HOSPITAL | Age: 74
End: 2024-09-02
Attending: INTERNAL MEDICINE
Payer: MEDICARE

## 2024-09-04 ENCOUNTER — APPOINTMENT (OUTPATIENT)
Dept: CARDIAC REHAB | Facility: HOSPITAL | Age: 74
End: 2024-09-04
Attending: INTERNAL MEDICINE
Payer: MEDICARE

## 2024-09-04 PROCEDURE — 93798 PHYS/QHP OP CAR RHAB W/ECG: CPT

## 2024-09-05 ENCOUNTER — APPOINTMENT (OUTPATIENT)
Dept: CARDIAC REHAB | Facility: HOSPITAL | Age: 74
End: 2024-09-05
Attending: INTERNAL MEDICINE
Payer: MEDICARE

## 2024-09-05 PROCEDURE — 93798 PHYS/QHP OP CAR RHAB W/ECG: CPT

## 2024-09-07 ENCOUNTER — HOSPITAL ENCOUNTER (EMERGENCY)
Facility: HOSPITAL | Age: 74
Discharge: HOME | End: 2024-09-07
Attending: EMERGENCY MEDICINE
Payer: MEDICARE

## 2024-09-07 ENCOUNTER — APPOINTMENT (OUTPATIENT)
Dept: GENERAL RADIOLOGY | Facility: HOSPITAL | Age: 74
End: 2024-09-07
Payer: MEDICARE

## 2024-09-07 VITALS
DIASTOLIC BLOOD PRESSURE: 60 MMHG | WEIGHT: 165 LBS | RESPIRATION RATE: 16 BRPM | HEART RATE: 60 BPM | BODY MASS INDEX: 27.49 KG/M2 | TEMPERATURE: 99 F | SYSTOLIC BLOOD PRESSURE: 126 MMHG | HEIGHT: 65 IN | OXYGEN SATURATION: 100 %

## 2024-09-07 DIAGNOSIS — S83.90XA SPRAIN OF KNEE, UNSPECIFIED LATERALITY, UNSPECIFIED LIGAMENT, INITIAL ENCOUNTER: Primary | ICD-10-CM

## 2024-09-07 PROCEDURE — 73560 X-RAY EXAM OF KNEE 1 OR 2: CPT | Performed by: EMERGENCY MEDICINE

## 2024-09-07 PROCEDURE — 99284 EMERGENCY DEPT VISIT MOD MDM: CPT

## 2024-09-07 RX ORDER — ACETAMINOPHEN 500 MG
1000 TABLET ORAL ONCE
Status: COMPLETED | OUTPATIENT
Start: 2024-09-07 | End: 2024-09-07

## 2024-09-07 RX ORDER — CLOPIDOGREL BISULFATE 75 MG/1
75 TABLET ORAL DAILY
COMMUNITY

## 2024-09-07 NOTE — DISCHARGE INSTRUCTIONS
Cool compresses for 24 hours 20 minutes every 2 hours with awake, then switch to heat  Use your walker to help support your knees  Tylenol for pain  Follow-up with orthopedics, call Monday for an appointment  Return for any problems

## 2024-09-07 NOTE — ED INITIAL ASSESSMENT (HPI)
Pt ambulated to er with family  States this am slid out of bed and landed on bilat knees then buttocks = pain at bilat knee/posterior=6/10 = no home meds/ice packs used for this pain  Hip repl right 2019 - feels ok  Denies loc/head injury -\" Was rushing\"  On plavix+

## 2024-09-07 NOTE — ED PROVIDER NOTES
Patient Seen in: [facility] Emergency Department      History     Chief Complaint   Patient presents with    Trauma    Leg or Foot Injury     Stated Complaint: fell out of bed and landed on to her knees and then on to her butt and now havi*    Subjective:   HPI    This is a 74-year-old female complains of bilateral knee pain status post fall with past medical history of coronary disease on Plavix/aspirin, high cholesterol who states that she was getting out of bed and she slid down to the floor onto her knees with her legs tucked behind her.  She states it was hard to get up off the floor.  She complains of bilateral knee pain and pain to her posterior thighs.  She did not hit her head.  No LOC.  No other injuries.  She took nothing for pain.  She is able to walk but it is painful.  She presents here for further evaluation.    Objective:   No pertinent past medical history.            No pertinent past surgical history.              No pertinent social history.            Review of Systems    Positive for stated Chief Complaint: Trauma and Leg or Foot Injury    Other systems are as noted in HPI.  Constitutional and vital signs reviewed.      All other systems reviewed and negative except as noted above.    Physical Exam     ED Triage Vitals [09/07/24 1702]   /60   Pulse 64   Resp 17   Temp 98.8 °F (37.1 °C)   Temp src Oral   SpO2 96 %   O2 Device None (Room air)       Current Vitals:   Vital Signs  BP: 126/60  Pulse: 61  Resp: 17  Temp: 98.8 °F (37.1 °C)  Temp src: Oral  MAP (mmHg): 80    Oxygen Therapy  SpO2: 100 %  O2 Device: None (Room air)            Physical Exam    GENERAL: Awake, alert oriented x3, nontoxic appearing.   SKIN: Normal, warm, and dry.  HEENT:  Pupils equally round and reactive to light. Conjuctiva clear.  Oropharynx is clear and moist.   Lungs: Clear to auscultation bilaterally with no rales, no retractions, and no wheezing.  HEART:  Regular rate and rhythm. S1 and S2. No murmurs,  no rubs or gallops.   ABDOMEN: Soft, nontender and nondistended. Normoactive bowel sounds. No rebound. No guarding.   EXTREMITIES: Tenderness to bilateral knees right greater than left.  No gross deformity.  The right knee is slightly swollen.  She has discomfort to palpation of her bilateral thighs.  Warm with brisk capillary refill.  No bruising.  +2/4 DP PT pulses.        ED Course   Labs Reviewed - No data to display          XR KNEE (1 OR 2 VIEWS), LEFT (CPT=73560)    Result Date: 9/7/2024  PROCEDURE:  XR KNEE (1 OR 2 VIEWS), LEFT (CPT=73560), 9/07/2024, 5:38 PM XR KNEE (1 OR 2 VIEWS), RIGHT (CPT=73560), 9/07/2024, 5:39 PM  COMPARISON:  None.  INDICATIONS:  fell out of bed and landed on to her knees and then on to her butt and now having pain behind both knees  PATIENT STATED HISTORY: (As transcribed by Technologist)  Patient states that she fell out of bed and landed on her knees. Patient has posterior knee pain on both knees.               CONCLUSION:  There is moderate patellofemoral compartment narrowing with hypertrophic spurring both knees right greater than left..  Mild medial compartment narrowing both knees with hypertrophic spurring.  Chondrocalcinosis left knee..   LOCATION:  [facility]   Dictated by (CST): Elaine Nuno MD on 9/07/2024 at 5:50 PM     Finalized by (CST): Elaine Nuno MD on 9/07/2024 at 5:51 PM       XR KNEE (1 OR 2 VIEWS), RIGHT (CPT=73560)    Result Date: 9/7/2024  PROCEDURE:  XR KNEE (1 OR 2 VIEWS), LEFT (CPT=73560), 9/07/2024, 5:38 PM XR KNEE (1 OR 2 VIEWS), RIGHT (CPT=73560), 9/07/2024, 5:39 PM  COMPARISON:  None.  INDICATIONS:  fell out of bed and landed on to her knees and then on to her butt and now having pain behind both knees  PATIENT STATED HISTORY: (As transcribed by Technologist)  Patient states that she fell out of bed and landed on her knees. Patient has posterior knee pain on both knees.               CONCLUSION:  There is moderate patellofemoral compartment narrowing  with hypertrophic spurring both knees right greater than left..  Mild medial compartment narrowing both knees with hypertrophic spurring.  Chondrocalcinosis left knee..   LOCATION:  [facility]   Dictated by (CST): Elaine Nuno MD on 9/07/2024 at 5:50 PM     Finalized by (CST): Elaine Nuno MD on 9/07/2024 at 5:51 PM              MDM    This is a 74-year-old female complains of bilateral knee pain status post fall with past medical history of coronary disease on Plavix/aspirin, high cholesterol who states that she was getting out of bed and she slid down to the floor onto her knees with her legs tucked behind her.  Differential includes knees  sprain/strain versus fracture.    Patient was given Tylenol for pain.    Bilateral knee x-rays were obtained and demonstrated no obvious fracture.  I reviewed the radiology interpretation and agreement.  She does have moderate patellofemoral compartment narrowing and hypertrophic spurring of both knees.  These findings were communicated to patient.    She is on Plavix so should only take Tylenol for pain.  She can do some cold compresses for the first 24 hours and then switch to heat.  I recommend she use her walker for ambulation.      She was discharged home in good condition.    Disposition and Plan     Clinical Impression:  1. Sprain of knee, unspecified laterality, unspecified ligament, initial encounter         Disposition:  Discharge  9/7/2024  6:27 pm    Follow-up:  Jack Aguayo MD  [address]  [address]  [facility] [address]  [phone]    Follow up on 9/9/2024            Medications Prescribed:  Current Discharge Medication List

## 2024-09-07 NOTE — ED QUICK NOTES
Spouse came to nurs station = received an update on poc - writer went to a5 = wife remains resting on cart and verbalized understanding of poc/wait time for xr to be read and tylenol to be digested. Spouse remains agitated with being in ed tonoc.

## 2024-09-09 ENCOUNTER — APPOINTMENT (OUTPATIENT)
Dept: CARDIAC REHAB | Facility: HOSPITAL | Age: 74
End: 2024-09-09
Attending: INTERNAL MEDICINE
Payer: MEDICARE

## 2024-09-11 ENCOUNTER — APPOINTMENT (OUTPATIENT)
Dept: CARDIAC REHAB | Facility: HOSPITAL | Age: 74
End: 2024-09-11
Attending: INTERNAL MEDICINE
Payer: MEDICARE

## 2024-09-12 ENCOUNTER — APPOINTMENT (OUTPATIENT)
Dept: CARDIAC REHAB | Facility: HOSPITAL | Age: 74
End: 2024-09-12
Attending: INTERNAL MEDICINE
Payer: MEDICARE

## 2024-09-16 ENCOUNTER — APPOINTMENT (OUTPATIENT)
Dept: CARDIAC REHAB | Facility: HOSPITAL | Age: 74
End: 2024-09-16
Attending: INTERNAL MEDICINE
Payer: MEDICARE

## 2024-09-18 ENCOUNTER — APPOINTMENT (OUTPATIENT)
Dept: CARDIAC REHAB | Facility: HOSPITAL | Age: 74
End: 2024-09-18
Attending: INTERNAL MEDICINE
Payer: MEDICARE

## 2024-09-19 ENCOUNTER — APPOINTMENT (OUTPATIENT)
Dept: CARDIAC REHAB | Facility: HOSPITAL | Age: 74
End: 2024-09-19
Attending: INTERNAL MEDICINE
Payer: MEDICARE

## 2024-09-23 ENCOUNTER — CARDPULM VISIT (OUTPATIENT)
Dept: CARDIAC REHAB | Facility: HOSPITAL | Age: 74
End: 2024-09-23
Attending: INTERNAL MEDICINE
Payer: MEDICARE

## 2024-09-23 PROCEDURE — 93798 PHYS/QHP OP CAR RHAB W/ECG: CPT

## 2024-09-25 ENCOUNTER — CARDPULM VISIT (OUTPATIENT)
Dept: CARDIAC REHAB | Facility: HOSPITAL | Age: 74
End: 2024-09-25
Attending: INTERNAL MEDICINE
Payer: MEDICARE

## 2024-09-25 PROCEDURE — 93798 PHYS/QHP OP CAR RHAB W/ECG: CPT

## 2024-09-26 ENCOUNTER — CARDPULM VISIT (OUTPATIENT)
Dept: CARDIAC REHAB | Facility: HOSPITAL | Age: 74
End: 2024-09-26
Attending: INTERNAL MEDICINE
Payer: MEDICARE

## 2024-09-26 PROCEDURE — 93798 PHYS/QHP OP CAR RHAB W/ECG: CPT

## 2024-09-30 ENCOUNTER — APPOINTMENT (OUTPATIENT)
Dept: CARDIAC REHAB | Facility: HOSPITAL | Age: 74
End: 2024-09-30
Attending: INTERNAL MEDICINE
Payer: MEDICARE

## 2024-10-02 ENCOUNTER — CARDPULM VISIT (OUTPATIENT)
Dept: CARDIAC REHAB | Facility: HOSPITAL | Age: 74
End: 2024-10-02
Attending: INTERNAL MEDICINE
Payer: MEDICARE

## 2024-10-02 PROCEDURE — 93798 PHYS/QHP OP CAR RHAB W/ECG: CPT

## 2024-10-03 ENCOUNTER — APPOINTMENT (OUTPATIENT)
Dept: CARDIAC REHAB | Facility: HOSPITAL | Age: 74
End: 2024-10-03
Attending: INTERNAL MEDICINE
Payer: MEDICARE

## 2024-10-03 PROCEDURE — 93798 PHYS/QHP OP CAR RHAB W/ECG: CPT

## 2024-10-07 ENCOUNTER — APPOINTMENT (OUTPATIENT)
Dept: CARDIAC REHAB | Facility: HOSPITAL | Age: 74
End: 2024-10-07
Attending: INTERNAL MEDICINE
Payer: MEDICARE

## 2024-10-07 PROCEDURE — 93798 PHYS/QHP OP CAR RHAB W/ECG: CPT

## 2024-10-09 ENCOUNTER — CARDPULM VISIT (OUTPATIENT)
Dept: CARDIAC REHAB | Facility: HOSPITAL | Age: 74
End: 2024-10-09
Attending: INTERNAL MEDICINE
Payer: MEDICARE

## 2024-10-09 PROCEDURE — 93798 PHYS/QHP OP CAR RHAB W/ECG: CPT

## 2024-10-10 ENCOUNTER — APPOINTMENT (OUTPATIENT)
Dept: CARDIAC REHAB | Facility: HOSPITAL | Age: 74
End: 2024-10-10
Attending: INTERNAL MEDICINE
Payer: MEDICARE

## 2024-10-10 PROCEDURE — 93798 PHYS/QHP OP CAR RHAB W/ECG: CPT

## 2024-10-14 ENCOUNTER — APPOINTMENT (OUTPATIENT)
Dept: CARDIAC REHAB | Facility: HOSPITAL | Age: 74
End: 2024-10-14
Attending: INTERNAL MEDICINE
Payer: MEDICARE

## 2024-10-16 ENCOUNTER — CARDPULM VISIT (OUTPATIENT)
Dept: CARDIAC REHAB | Facility: HOSPITAL | Age: 74
End: 2024-10-16
Attending: INTERNAL MEDICINE
Payer: MEDICARE

## 2024-10-16 PROCEDURE — 93798 PHYS/QHP OP CAR RHAB W/ECG: CPT

## 2024-10-17 ENCOUNTER — CARDPULM VISIT (OUTPATIENT)
Dept: CARDIAC REHAB | Facility: HOSPITAL | Age: 74
End: 2024-10-17
Attending: INTERNAL MEDICINE
Payer: MEDICARE

## 2024-10-17 PROCEDURE — 93798 PHYS/QHP OP CAR RHAB W/ECG: CPT

## 2024-10-21 ENCOUNTER — CARDPULM VISIT (OUTPATIENT)
Dept: CARDIAC REHAB | Facility: HOSPITAL | Age: 74
End: 2024-10-21
Attending: INTERNAL MEDICINE
Payer: MEDICARE

## 2024-10-21 PROCEDURE — 93798 PHYS/QHP OP CAR RHAB W/ECG: CPT

## 2024-10-23 ENCOUNTER — CARDPULM VISIT (OUTPATIENT)
Dept: CARDIAC REHAB | Facility: HOSPITAL | Age: 74
End: 2024-10-23
Attending: INTERNAL MEDICINE
Payer: MEDICARE

## 2024-10-23 PROCEDURE — 93798 PHYS/QHP OP CAR RHAB W/ECG: CPT

## 2024-10-24 ENCOUNTER — CARDPULM VISIT (OUTPATIENT)
Dept: CARDIAC REHAB | Facility: HOSPITAL | Age: 74
End: 2024-10-24
Attending: INTERNAL MEDICINE
Payer: MEDICARE

## 2024-10-24 PROCEDURE — 93798 PHYS/QHP OP CAR RHAB W/ECG: CPT

## 2024-10-28 ENCOUNTER — APPOINTMENT (OUTPATIENT)
Dept: CARDIAC REHAB | Facility: HOSPITAL | Age: 74
End: 2024-10-28
Attending: INTERNAL MEDICINE
Payer: MEDICARE

## 2024-10-30 ENCOUNTER — CARDPULM VISIT (OUTPATIENT)
Dept: CARDIAC REHAB | Facility: HOSPITAL | Age: 74
End: 2024-10-30
Attending: INTERNAL MEDICINE
Payer: MEDICARE

## 2024-10-30 PROCEDURE — 93798 PHYS/QHP OP CAR RHAB W/ECG: CPT

## 2024-10-31 ENCOUNTER — CARDPULM VISIT (OUTPATIENT)
Dept: CARDIAC REHAB | Facility: HOSPITAL | Age: 74
End: 2024-10-31
Attending: INTERNAL MEDICINE
Payer: MEDICARE

## 2024-10-31 PROCEDURE — 93798 PHYS/QHP OP CAR RHAB W/ECG: CPT

## 2024-11-04 ENCOUNTER — CARDPULM VISIT (OUTPATIENT)
Dept: CARDIAC REHAB | Facility: HOSPITAL | Age: 74
End: 2024-11-04
Attending: INTERNAL MEDICINE
Payer: MEDICARE

## 2024-11-04 PROCEDURE — 93798 PHYS/QHP OP CAR RHAB W/ECG: CPT

## 2024-11-06 ENCOUNTER — APPOINTMENT (OUTPATIENT)
Dept: CARDIAC REHAB | Facility: HOSPITAL | Age: 74
End: 2024-11-06
Attending: INTERNAL MEDICINE
Payer: MEDICARE

## 2024-11-07 ENCOUNTER — CARDPULM VISIT (OUTPATIENT)
Dept: CARDIAC REHAB | Facility: HOSPITAL | Age: 74
End: 2024-11-07
Attending: INTERNAL MEDICINE
Payer: MEDICARE

## 2024-11-11 ENCOUNTER — CARDPULM VISIT (OUTPATIENT)
Dept: CARDIAC REHAB | Facility: HOSPITAL | Age: 74
End: 2024-11-11
Attending: INTERNAL MEDICINE
Payer: MEDICARE

## 2024-11-11 PROCEDURE — 93798 PHYS/QHP OP CAR RHAB W/ECG: CPT

## 2024-11-13 ENCOUNTER — APPOINTMENT (OUTPATIENT)
Dept: CARDIAC REHAB | Facility: HOSPITAL | Age: 74
End: 2024-11-13
Attending: INTERNAL MEDICINE
Payer: MEDICARE

## 2024-11-14 ENCOUNTER — CARDPULM VISIT (OUTPATIENT)
Dept: CARDIAC REHAB | Facility: HOSPITAL | Age: 74
End: 2024-11-14
Attending: INTERNAL MEDICINE
Payer: MEDICARE

## 2024-11-14 PROCEDURE — 93798 PHYS/QHP OP CAR RHAB W/ECG: CPT

## 2024-11-18 ENCOUNTER — CARDPULM VISIT (OUTPATIENT)
Dept: CARDIAC REHAB | Facility: HOSPITAL | Age: 74
End: 2024-11-18
Attending: INTERNAL MEDICINE
Payer: MEDICARE

## 2024-11-18 PROCEDURE — 93798 PHYS/QHP OP CAR RHAB W/ECG: CPT

## 2024-11-20 ENCOUNTER — CARDPULM VISIT (OUTPATIENT)
Dept: CARDIAC REHAB | Facility: HOSPITAL | Age: 74
End: 2024-11-20
Attending: INTERNAL MEDICINE
Payer: MEDICARE

## 2024-11-20 PROCEDURE — 93798 PHYS/QHP OP CAR RHAB W/ECG: CPT

## 2024-12-06 ENCOUNTER — APPOINTMENT (OUTPATIENT)
Dept: GENERAL RADIOLOGY | Facility: HOSPITAL | Age: 74
End: 2024-12-06
Attending: EMERGENCY MEDICINE
Payer: MEDICARE

## 2024-12-06 ENCOUNTER — HOSPITAL ENCOUNTER (EMERGENCY)
Facility: HOSPITAL | Age: 74
Discharge: HOME | End: 2024-12-06
Attending: EMERGENCY MEDICINE
Payer: MEDICARE

## 2024-12-06 VITALS
HEART RATE: 63 BPM | TEMPERATURE: 97 F | DIASTOLIC BLOOD PRESSURE: 61 MMHG | RESPIRATION RATE: 18 BRPM | HEIGHT: 65 IN | OXYGEN SATURATION: 97 % | WEIGHT: 163 LBS | SYSTOLIC BLOOD PRESSURE: 107 MMHG | BODY MASS INDEX: 27.16 KG/M2

## 2024-12-06 DIAGNOSIS — S80.02XA CONTUSION OF LEFT KNEE, INITIAL ENCOUNTER: ICD-10-CM

## 2024-12-06 DIAGNOSIS — S80.01XA CONTUSION OF RIGHT KNEE, INITIAL ENCOUNTER: ICD-10-CM

## 2024-12-06 DIAGNOSIS — S93.401A SPRAIN OF RIGHT ANKLE, UNSPECIFIED LIGAMENT, INITIAL ENCOUNTER: Primary | ICD-10-CM

## 2024-12-06 PROCEDURE — 73562 X-RAY EXAM OF KNEE 3: CPT | Performed by: EMERGENCY MEDICINE

## 2024-12-06 PROCEDURE — 73610 X-RAY EXAM OF ANKLE: CPT | Performed by: EMERGENCY MEDICINE

## 2024-12-06 PROCEDURE — 99284 EMERGENCY DEPT VISIT MOD MDM: CPT

## 2024-12-06 RX ORDER — HYDROCODONE BITARTRATE AND ACETAMINOPHEN 5; 325 MG/1; MG/1
1 TABLET ORAL EVERY 4 HOURS PRN
Qty: 10 TABLET | Refills: 0 | Status: SHIPPED | OUTPATIENT
Start: 2024-12-06 | End: 2024-12-08

## 2024-12-06 RX ORDER — HYDROCODONE BITARTRATE AND ACETAMINOPHEN 5; 325 MG/1; MG/1
1 TABLET ORAL ONCE
Status: COMPLETED | OUTPATIENT
Start: 2024-12-06 | End: 2024-12-06

## 2024-12-06 NOTE — ED PROVIDER NOTES
Patient Seen in: [facility] Emergency Department      History     Chief Complaint   Patient presents with    Leg or Foot Injury     Stated Complaint: fall from bed thursday morning onto knees, now more painful r leg most painful    Subjective:   HPI      74-year-old female complaining of right leg pain patient loss of bowel slipped off the edge of the bed landing onto both knees having pain in these areas the right greater than the left but also injured her right ankle.  This happened just before coming in.  She had difficulty ambulating.  No other injury to her head or neck.    Objective:     Past Medical History:    ALLERGIC RHINITIS    Anesthesia complication    BACK PAIN    Back problem    SI Joint issues    Cancer (HCC)    squamous cell skin cancer on arm and hand    Cervical disc syndrome Dx by MRI (7/04)     Chronic rhinitis    Depression    Disorder of thyroid    History of thyroid issues when on birth control    Diverticulitis of colon (without mention of hemorrhage)(562.11)    Diverticulosis of colon (without mention of hemorrhage)    Functional intestinal disorder / Colonoscopy [3/18/14] - recheck 10 years - RAYSHAWN Haney    H/O diverticulitis of colon [9/13], [12/13]    HEADACHES    HIGH BLOOD PRESSURE    HIGH CHOLESTEROL    History of blood transfusion    HOSPITALIZATIONS    HYPERLIPIDEMIA    Mixed rhinitis [vasomotor & allergic] / Rx cetirizine & singulair    MVP (mitral valve prolapse)    Obstructive apnea    DMG SPLIT AHI 43 SaO2 cliff 87 % CPAP 8     Obstructive apnea    DMG SPLIT AHI 43 SaO2 cliff 87 % CPAP 8 [facility]    ARMAAN on CPAP    Osteoarthritis    Other and unspecified hyperlipidemia    PONV (postoperative nausea and vomiting)    Pseudophakia of both eyes - EDYTA [place]     S/P hip replacement, right - 11/2019    R anterior ARTHUR performed by Dr. Rivera 11/18/19     Seborrheic dermatitis [CASH Doty - dermatologist]    Sleep apnea    no treatment    Sprain of tarsometatarsal (joint) (ligament) of  foot RT FOOT LISFRANC LIG GLOBAL THRU 8/11/14    Unifocal PVCs / Holter (11/12)    Unspecified essential hypertension    Visual impairment    Glasses              Past Surgical History:   Procedure Laterality Date    Appendectomy      Appendectomy      Cath angio  6/19/2024    Colonoscopy  2009    diverticulosis and ischemic colitis    Colonoscopy  03/18/2014    Procedure: COLONOSCOPY;  Surgeon: Tino Haney MD;  Location:  ENDOSCOPY    Foot fracture surgery  05/2014    R foot 2nd metatarsal fusion/hardware    Hysterectomy      Injection, anesthetic/steroid, transforaminal epidural; lumbar/sacral, single level Right 05/18/2016    Procedure: LUMBAR / TRANSFORAMINAL EPIDURAL STEROID INJECTION;  Surgeon: Vivi Corona MD;  Location: [facility]    Orthopedic surg ([facility])      right foot neuroma removed    Orthopedic surg ([facility])      bilateral plantar fasciitis    Other  05/18/2018    Dr. Parish, eye lid lifted, plastic surgeon    Other surgical history  2009    EGD - small hiatal hernia    Patient documented not to have experienced any of the following events Right 05/18/2016    Procedure: LUMBAR / TRANSFORAMINAL EPIDURAL STEROID INJECTION;  Surgeon: Vivi Corona MD;  Location: [facility]    Patient withough preoperative order for iv antibiotic surgical site infection prophylaxis. Right 05/18/2016    Procedure: LUMBAR / TRANSFORAMINAL EPIDURAL STEROID INJECTION;  Surgeon: Vivi Corona MD;  Location: [facility]    Removal of tonsils,10+ y/o      Total hip arthroplasty Right 11/18/2019    with Dr. Rivera 11/18/19                Social History     Socioeconomic History    Marital status:    Tobacco Use    Smoking status: Never    Smokeless tobacco: Never   Vaping Use    Vaping status: Never Used   Substance and Sexual Activity    Alcohol use: Yes     Alcohol/week: 1.0 standard drink of alcohol     Types: 1 Glasses of wine per week     Comment: 3 per week     Drug use: No                  Physical Exam     ED Triage Vitals   BP 12/06/24 0642 107/61   Pulse 12/06/24 0642 62   Resp 12/06/24 0642 18   Temp 12/06/24 0655 97 °F (36.1 °C)   Temp src 12/06/24 0655 Temporal   SpO2 12/06/24 0642 98 %   O2 Device 12/06/24 0642 None (Room air)       Current Vitals:   Vital Signs  BP: 107/61  Pulse: 63  Resp: 18  Temp: 97 °F (36.1 °C)  Temp src: Temporal  MAP (mmHg): 76    Oxygen Therapy  SpO2: 97 %  O2 Device: None (Room air)        Physical Exam  Patient is alert and orient x 3 no acute distress the head is atraumatic neck nontender back nontender chest ribs nontender abdomen soft and nontender the upper extremities left lower extremity normal with exception on the left knee there is mild tenderness of the anterior aspect of the knee is full range of motion neurovascular is intact the quadriceps and patellar tendon are intact there is no effusion.  The right lower extremity the hips nontender the knee there is moderate tenderness there is slight decreased range of motion ligaments and tendons are intact.  The right ankle is moderate swelling and tenderness over the lateral malleolus neurovascular is intact distally.  Mental status alert and oriented ×3  Cranial nerves II through XII within normal limits  Motor function is intact in all 4 extremities  Sensation is intact in all 4 extremities  Cerebellar finger-nose and heel-to-shin are normal  Deep tendon reflexes are normal    ED Course   Labs Reviewed - No data to display       XR KNEE (3 VIEWS), LEFT (CPT=73562)    Result Date: 12/6/2024  CONCLUSION:  See above.   LOCATION:  [facility]   Dictated by (CST): Marco Antonio Dowling MD on 12/06/2024 at 8:11 AM     Finalized by (CST): Marco Antonio Dowling MD on 12/06/2024 at 8:11 AM       XR KNEE (3 VIEWS), RIGHT (CPT=73562)    Result Date: 12/6/2024  CONCLUSION:  See above.   LOCATION:  [facility]   Dictated by (CST): Marco Antonio Dowling MD on 12/06/2024 at 8:10 AM     Finalized by (CST): Marco Antonio Dowling MD on 12/06/2024 at  8:11 AM       XR ANKLE (MIN 3 VIEWS), RIGHT (CPT=73610)    Result Date: 12/6/2024  CONCLUSION:  See above.   LOCATION:  [facility]   Dictated by (CST): Marco Antonio Dowling MD on 12/06/2024 at 8:10 AM     Finalized by (CST): Marco Antonio Dowling MD on 12/06/2024 at 8:10 AM      Images independent reviewed there is no fracture       MDM      Initial differential diagnosis considered but not limited to includes fracture contusion sprain dislocation        Medical Decision Making    Patient was given knee immobilizer of the right knee ankle stirrup splint and Ace wrap on the left and Norco for pain advised to follow-up orthopedic doctor in a week if not better return if worse.  Disposition and Plan     Clinical Impression:  1. Sprain of right ankle, unspecified ligament, initial encounter    2. Contusion of right knee, initial encounter    3. Contusion of left knee, initial encounter         Disposition:  Discharge  12/6/2024  8:54 am    Follow-up:  Bran Martinez MD  [address] [place] [address]  [phone]    Follow up in 1 week(s)            Medications Prescribed:  Discharge Medication List as of 12/6/2024  8:56 AM        START taking these medications    Details   HYDROcodone-acetaminophen 5-325 MG Oral Tab Take 1 tablet by mouth every 4 (four) hours as needed for Pain., Normal, Disp-10 tablet, R-0      Naloxone HCl 4 MG/0.1ML Nasal Liquid 4 mg by Intranasal route as needed (May repeat as needed in other nostril if symptoms persist). If patient remains unresponsive, repeat dose in other nostril 2-5 minutes after first dose., Normal, Disp-1 kit, R-0                 Supplementary Documentation:

## 2024-12-06 NOTE — DISCHARGE INSTRUCTIONS
Tylenol for milder pain take the Norco for more severe pain.  Norco will make you drowsy do not drink or drive while taking it do not mix it with taking Klonopin or clonazepam at the same time as both of these will make you drowsy.  Follow-up with orthopedic doctor next week.  Use the walker as needed over the next few days and the ankle brace Ace wrap and ankle stirrup splint and knee brace for about 1 week.

## 2024-12-06 NOTE — ED INITIAL ASSESSMENT (HPI)
Pt wheeled into the ED from home with c/o right leg, ankle & knee pain after slipping out of bed. Pt states she landed on her \"bottom\" and denies hitting her head. +thinners

## 2025-03-17 ENCOUNTER — APPOINTMENT (OUTPATIENT)
Dept: GENERAL RADIOLOGY | Facility: HOSPITAL | Age: 75
End: 2025-03-17
Attending: EMERGENCY MEDICINE
Payer: MEDICARE

## 2025-03-17 ENCOUNTER — HOSPITAL ENCOUNTER (EMERGENCY)
Facility: HOSPITAL | Age: 75
Discharge: HOME | End: 2025-03-17
Attending: EMERGENCY MEDICINE
Payer: MEDICARE

## 2025-03-17 ENCOUNTER — APPOINTMENT (OUTPATIENT)
Dept: CT IMAGING | Facility: HOSPITAL | Age: 75
End: 2025-03-17
Attending: EMERGENCY MEDICINE
Payer: MEDICARE

## 2025-03-17 VITALS
SYSTOLIC BLOOD PRESSURE: 132 MMHG | WEIGHT: 170 LBS | HEART RATE: 50 BPM | DIASTOLIC BLOOD PRESSURE: 69 MMHG | TEMPERATURE: 97 F | RESPIRATION RATE: 16 BRPM | BODY MASS INDEX: 28.32 KG/M2 | HEIGHT: 65 IN | OXYGEN SATURATION: 98 %

## 2025-03-17 DIAGNOSIS — M25.561 ACUTE PAIN OF RIGHT KNEE: Primary | ICD-10-CM
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
## 2025-03-17 DIAGNOSIS — S09.90XA INJURY OF HEAD, INITIAL ENCOUNTER: ICD-10-CM

## 2025-03-17 DIAGNOSIS — S40.021A CONTUSION OF RIGHT UPPER EXTREMITY, INITIAL ENCOUNTER: ICD-10-CM

## 2025-03-17 PROCEDURE — 99285 EMERGENCY DEPT VISIT HI MDM: CPT

## 2025-03-17 PROCEDURE — 73560 X-RAY EXAM OF KNEE 1 OR 2: CPT | Performed by: EMERGENCY MEDICINE

## 2025-03-17 PROCEDURE — 99284 EMERGENCY DEPT VISIT MOD MDM: CPT

## 2025-03-17 PROCEDURE — 70450 CT HEAD/BRAIN W/O DYE: CPT | Performed by: EMERGENCY MEDICINE

## 2025-03-17 PROCEDURE — 73060 X-RAY EXAM OF HUMERUS: CPT | Performed by: EMERGENCY MEDICINE

## 2025-03-17 RX ORDER — HYDROCODONE BITARTRATE AND ACETAMINOPHEN 5; 325 MG/1; MG/1
1 TABLET ORAL ONCE
Status: COMPLETED | OUTPATIENT
Start: 2025-03-17 | End: 2025-03-17

## 2025-03-17 RX ORDER — HYDROCODONE BITARTRATE AND ACETAMINOPHEN 5; 325 MG/1; MG/1
1-2 TABLET ORAL EVERY 6 HOURS PRN
Qty: 12 TABLET | Refills: 0 | Status: SHIPPED | OUTPATIENT
Start: 2025-03-17 | End: 2025-03-24

## 2025-03-17 NOTE — ED QUICK NOTES
Pt was able to ambulate with slow but steady gait with use of walker and transfer into a wheelchair.     Pt's daughter will be staying with her.     Discussed talking tylenol for pain at home in order to minimize norco use.     Offered to have pt and daughter speak with case management but states they do not think they will need any additional assistance.

## 2025-03-17 NOTE — ED INITIAL ASSESSMENT (HPI)
Fall at 2200 ,  contusion to forehad , right jaw and right knee pain. On Plavix. Hx of right knee issues

## 2025-03-17 NOTE — ED PROVIDER NOTES
Patient Seen in: [facility] Emergency Department      History     Chief Complaint   Patient presents with    Trauma     Stated Complaint: fall at 2200m on plavix    Subjective:   HPI      75-year-old woman here for evaluation after fall.  States last night was going to take the leash off her dogs, her, bent down her right knee felt like it gave out on her and buckled fell down onto her right knee into 2 barstool's, hit her head as well no LOC, complains mostly of pain to the right knee..  Also bruising to right upper arm.    Objective:     Past Medical History:    ALLERGIC RHINITIS    Anesthesia complication    BACK PAIN    Back problem    SI Joint issues    Cancer (HCC)    squamous cell skin cancer on arm and hand    Cervical disc syndrome Dx by MRI (7/04)     Chronic rhinitis    Depression    Disorder of thyroid    History of thyroid issues when on birth control    Diverticulitis of colon (without mention of hemorrhage)(562.11)    Diverticulosis of colon (without mention of hemorrhage)    Functional intestinal disorder / Colonoscopy [3/18/14] - recheck 10 years - RAYSHAWN Haney    H/O diverticulitis of colon [9/13], [12/13]    HEADACHES    HIGH BLOOD PRESSURE    HIGH CHOLESTEROL    History of blood transfusion    HOSPITALIZATIONS    HYPERLIPIDEMIA    Mixed rhinitis [vasomotor & allergic] / Rx cetirizine & singulair    MVP (mitral valve prolapse)    Obstructive apnea    DMG SPLIT AHI 43 SaO2 cliff 87 % CPAP 8     Obstructive apnea    DMG SPLIT AHI 43 SaO2 cliff 87 % CPAP 8 [facility]    ARMAAN on CPAP    Osteoarthritis    Other and unspecified hyperlipidemia    PONV (postoperative nausea and vomiting)    Pseudophakia of both eyes - EDYTA [place]     S/P hip replacement, right - 11/2019    R anterior ARTHUR performed by Dr. Rivera 11/18/19     Seborrheic dermatitis [CASH Doty - dermatologist]    Sleep apnea    no treatment    Sprain of tarsometatarsal (joint) (ligament) of foot RT FOOT LISFRANC LIG GLOBAL THRU 8/11/14     Unifocal PVCs / Holter (11/12)    Unspecified essential hypertension    Visual impairment    Glasses              Past Surgical History:   Procedure Laterality Date    Appendectomy      Appendectomy      Cath angio  6/19/2024    Colonoscopy  2009    diverticulosis and ischemic colitis    Colonoscopy  03/18/2014    Procedure: COLONOSCOPY;  Surgeon: Tino Haney MD;  Location:  ENDOSCOPY    Foot fracture surgery  05/2014    R foot 2nd metatarsal fusion/hardware    Hysterectomy      Injection, anesthetic/steroid, transforaminal epidural; lumbar/sacral, single level Right 05/18/2016    Procedure: LUMBAR / TRANSFORAMINAL EPIDURAL STEROID INJECTION;  Surgeon: Vivi Corona MD;  Location: [facility]    Orthopedic surg ([facility])      right foot neuroma removed    Orthopedic surg ([facility])      bilateral plantar fasciitis    Other  05/18/2018    Dr. Parish, eye lid lifted, plastic surgeon    Other surgical history  2009    EGD - small hiatal hernia    Patient documented not to have experienced any of the following events Right 05/18/2016    Procedure: LUMBAR / TRANSFORAMINAL EPIDURAL STEROID INJECTION;  Surgeon: Vivi Corona MD;  Location: [facility]    Patient withough preoperative order for iv antibiotic surgical site infection prophylaxis. Right 05/18/2016    Procedure: LUMBAR / TRANSFORAMINAL EPIDURAL STEROID INJECTION;  Surgeon: Vivi Corona MD;  Location: [facility]    Removal of tonsils,10+ y/o      Total hip arthroplasty Right 11/18/2019    with Dr. Rivera 11/18/19                Social History     Socioeconomic History    Marital status:    Tobacco Use    Smoking status: Never    Smokeless tobacco: Never   Vaping Use    Vaping status: Never Used   Substance and Sexual Activity    Alcohol use: Yes     Alcohol/week: 1.0 standard drink of alcohol     Types: 1 Glasses of wine per week     Comment: 3 per week    Drug use: No                  Physical Exam     ED  Triage Vitals   BP 03/17/25 0503 138/73   Pulse 03/17/25 0503 57   Resp 03/17/25 0503 22   Temp 03/17/25 0507 97.4 °F (36.3 °C)   Temp src 03/17/25 0507 Temporal   SpO2 03/17/25 0503 100 %   O2 Device 03/17/25 0503 None (Room air)       Current Vitals:   Vital Signs  BP: 136/64  Pulse: 52  Resp: 20  Temp: 97.4 °F (36.3 °C)  Temp src: Temporal  MAP (mmHg): 85    Oxygen Therapy  SpO2: 100 %  O2 Device: None (Room air)        Physical Exam      Physical Exam  Vitals signs and nursing note reviewed.   General:  Patient laying supine in the bed in no acute distress  Head: Normocephalic and atraumatic.  Neck: No midline tenderness no pain with range of motion  HEENT:  Mucous membranes are moist.   Cardiovascular:  Normal rate and regular rhythm.  No Edema  Pulmonary:  Pulmonary effort is normal.  Normal breath sounds. No wheezing, rhonchi or rales.   Abdominal: Soft nontender nondistended, normal bowel sounds, no guarding no rebound tenderness  Right knee: Effusion warmth no erythema to the right knee, patient is able to flex knee to 60 degrees fully extend.  Able to move toes and flex and extend right ankle.  Extensor mechanism is intact, DP pulse 2+.  No gross instability with valgus or varus stress, anterior and posterior drawer test are negative.  Skin: Warm and dry  Neurological: Awake alert, speech is normal      ED Course   Labs Reviewed - No data to display       XR KNEE (1 OR 2 VIEWS), RIGHT (CPT=73560)    Result Date: 3/17/2025  PROCEDURE:  XR KNEE (1 OR 2 VIEWS), RIGHT (CPT=73560)  COMPARISON:  [facility] , XR, XR KNEE (3 VIEWS), RIGHT (CPT=73562), 12/06/2024, 7:31 AM.  INDICATIONS:  fall at 2200m on plavix, pain  PATIENT STATED HISTORY: (As transcribed by Technologist)  Patient offered no additional history at this time.    FINDINGS:  No acute fracture or dislocation is seen.  No joint effusion is seen.  Patellofemoral joint space narrowing is likely present with small osteophyte formation seen over the lateral  aspect of the femoral condyle and lateral tibial plateau.            CONCLUSION:  See above.   LOCATION:  [id number]   Dictated by (CST): Marco Antonio Dowling MD on 3/17/2025 at 6:47 AM     Finalized by (CST): Marco Antonio Dowling MD on 3/17/2025 at 6:48 AM       XR HUMERUS (MIN 2 VIEWS), RIGHT (CPT=73060)    Result Date: 3/17/2025  PROCEDURE:  XR HUMERUS (MIN 2 VIEWS), RIGHT(CPT=73060)  INDICATIONS:  fall at 2200m on plavix, pain  COMPARISON:  None.  PATIENT STATED HISTORY: (As transcribed by Technologist)  Patient offered no additional history at this time.    FINDINGS:  BONES:  Normal.  No significant arthropathy or acute abnormality. SOFT TISSUES:  Negative.  No visible soft tissue swelling. EFFUSION:   None visible. OTHER:  If clinical symptoms persist then recommend follow-up imaging.            CONCLUSION:  See above.   LOCATION:  [id number]   Dictated by (CST): Marco Antonio Dowling MD on 3/17/2025 at 6:47 AM     Finalized by (CST): Marco Antonio Dowling MD on 3/17/2025 at 6:47 AM       CT BRAIN OR HEAD (CPT=70450)    Result Date: 3/17/2025  PROCEDURE:  CT BRAIN OR HEAD (26154)  COMPARISON:  [facility], BRAIN W/O CONTRAST, 8/24/2009, 12:16 PM.  INDICATIONS:  fall head injury, pain  TECHNIQUE:  Noncontrast CT scanning is performed through the brain. Dose reduction techniques were used. Dose information is transmitted to the ACR (American College of Radiology) NRDR (National Radiology Data Registry) which includes the Dose Index Registry.  PATIENT STATED HISTORY: (As transcribed by Technologist)  Patient fell forward onto her face. Hematoma to forehead.    FINDINGS:  VENTRICLES/SULCI:  Ventricles and sulci are normal in size.  INTRACRANIAL:  There are no abnormal extraaxial fluid collections.  There is no midline shift.  A background of mild to moderate chronic microvascular ischemic changes likely present within the white matter.  There is nothing specific for acute infarct.  There is no hemorrhage or mass lesion.  SINUSES:           No sign of acute  sinusitis.  MASTOIDS:          No sign of acute inflammation. SKULL:             No evidence for fracture or osseous abnormality. OTHER:             None.            CONCLUSION:  No significant midline shift or mass effect.  No acute intracranial hemorrhage.  Mild chronic microvascular ischemic changes are likely present within the white matter.  If there is persistent clinical concern then consider MRI.     LOCATION:  [id number]   Dictated by (CST): Marco Antonio Dowling MD on 3/17/2025 at 6:29 AM     Finalized by (CST): Marco Antonio Dowling MD on 3/17/2025 at 6:31 AM             MDM      75-year-old woman here for evaluation after fall.  Differential includes head injury intracranial hemorrhage, right knee fracture right humerus fracture.  Will obtain x-ray right humerus right knee, obtain CT of the head reevaluate.    CT head I independently viewed and interpreted the following imaging: CT head with no acute intracranial hemorrhage    X-ray right knee x-ray right arm with no acute fracture.  Patient placed in knee immobilizer, will be weightbearing as tolerated using a walker follow-up with her orthopedist return precautions discussed patient and daughter at bedside in agreement with plan.    Medical Decision Making      Disposition and Plan     Clinical Impression:  1. Acute pain of right knee    2. Contusion of right upper extremity, initial encounter    3. Fall, initial encounter    4. Injury of head, initial encounter         Disposition:  Discharge  3/17/2025  7:11 am    Follow-up:  Karli Gutierrez MD  [address] [address] [phone]    Follow up  Follow-up with your PMD for reevaluation in 24 to 48 hours.  Return to ER if symptoms worsen or change or if any other new concerns.    Rajiv Vanegas MD  [address] [facility]  [address]  [facility] [address] [phone]    Call in 1 day(s)            Medications Prescribed:  Current Discharge Medication List        START taking these medications    Details    HYDROcodone-acetaminophen 5-325 MG Oral Tab Take 1-2 tablets by mouth every 6 (six) hours as needed for Pain (do not take this medication prior to drinking alcohol or driving as this medication can impair your senses.). Do not drink alcohol or drive while taking this medication as it can impair your senses.  Qty: 12 tablet, Refills: 0    Associated Diagnoses: Acute pain of right knee                 Supplementary Documentation:

## 2025-03-17 NOTE — ED QUICK NOTES
Pt appears comfortable on cart, knee immobilizer in place. Pt reports she has pain to knee 6/10 with any movement but improved after norco.    Pt's daughter at bedside.     Pt states she has a walker and cane to use at home for support. Urged pt to use walker for now for added support with ambulation and pt and daughter verbalized understanding.

## 2025-03-19 ENCOUNTER — TELEPHONE (OUTPATIENT)
Dept: ORTHOPEDICS CLINIC | Facility: CLINIC | Age: 75
End: 2025-03-19

## 2025-03-19 DIAGNOSIS — M25.561 RIGHT KNEE PAIN, UNSPECIFIED CHRONICITY: Primary | ICD-10-CM

## 2025-03-21 ENCOUNTER — HOSPITAL ENCOUNTER (OUTPATIENT)
Dept: GENERAL RADIOLOGY | Age: 75
Discharge: HOME | End: 2025-03-21
Attending: ORTHOPAEDIC SURGERY
Payer: MEDICARE

## 2025-03-21 ENCOUNTER — OFFICE VISIT (OUTPATIENT)
Dept: ORTHOPEDICS CLINIC | Facility: CLINIC | Age: 75
End: 2025-03-21
Payer: MEDICARE

## 2025-03-21 VITALS — HEIGHT: 65 IN | BODY MASS INDEX: 28.16 KG/M2 | WEIGHT: 169 LBS

## 2025-03-21 DIAGNOSIS — M17.11 PRIMARY OSTEOARTHRITIS OF RIGHT KNEE: ICD-10-CM

## 2025-03-21 DIAGNOSIS — S80.01XA CONTUSION OF RIGHT KNEE, INITIAL ENCOUNTER: Primary | ICD-10-CM

## 2025-03-21 DIAGNOSIS — M25.561 RIGHT KNEE PAIN, UNSPECIFIED CHRONICITY: ICD-10-CM

## 2025-03-21 PROCEDURE — 1159F MED LIST DOCD IN RCRD: CPT | Performed by: ORTHOPAEDIC SURGERY

## 2025-03-21 PROCEDURE — 73564 X-RAY EXAM KNEE 4 OR MORE: CPT | Performed by: ORTHOPAEDIC SURGERY

## 2025-03-21 PROCEDURE — 99214 OFFICE O/P EST MOD 30 MIN: CPT | Performed by: ORTHOPAEDIC SURGERY

## 2025-03-21 PROCEDURE — 1160F RVW MEDS BY RX/DR IN RCRD: CPT | Performed by: ORTHOPAEDIC SURGERY

## 2025-03-21 NOTE — PROGRESS NOTES
Chief Complaint: Knee Pain (Right knee - Pt had about 4 falls sin January. Recent fall on 03/16 was picking something over and fell forward. Pain is constant.  Taking pain mediations. Rates pain 6/10. )      HPI  Ms. Pichardo is referred by No ref. provider found. Marilyn Pichardo is a 75 year old female being seen in the office today for Knee Pain (Right knee - Pt had about 4 falls sin January. Recent fall on 03/16 was picking something over and fell forward. Pain is constant.  Taking pain mediations. Rates pain 6/10. )    The patient is a 75-year-old female presents today with complaints of right knee pain.  She states that she has had multiple falls but most recently fell last Sunday.  She landed directly on her right knee anteriorly and also on her face.  She did go to the emergency room the next day.  She had x-rays which were negative for fracture.  She was recommended a knee immobilizer, and orthopedic evaluation.  She presents today for orthopedic evaluation.  Prior to this event she did have some baseline anterior knee pain.  However since this injury the pain is much more severe.  Pain localized to the anteromedial aspect of the knee.  Denies any numbness or tingling.  Denies any mechanical symptoms.  She does note some subjective instability.  She describes the pain as a sharp pain.  She also notes some bruising.  Denies any significant amount of swelling.  She has been using her knee immobilizer, Tylenol, and a quad cane to alleviate her symptoms.  She cannot take any anti-inflammatories as she is on blood thinners.  She lives with her  who does have dementia.  She is here with her daughter who had to bring her today as the patient is not driving after the injury.    History:  Past Medical History:    ALLERGIC RHINITIS    Anesthesia complication    BACK PAIN    Back problem    SI Joint issues    Cancer (HCC)    squamous cell skin cancer on arm and hand    Cervical disc syndrome Dx by MRI (7/04)      Chronic rhinitis    Depression    Disorder of thyroid    History of thyroid issues when on birth control    Diverticulitis of colon (without mention of hemorrhage)(562.11)    Diverticulosis of colon (without mention of hemorrhage)    Functional intestinal disorder / Colonoscopy [3/18/14] - recheck 10 years - RAYSHAWN Haney    H/O diverticulitis of colon [9/13], [12/13]    HEADACHES    HIGH BLOOD PRESSURE    HIGH CHOLESTEROL    History of blood transfusion    HOSPITALIZATIONS    HYPERLIPIDEMIA    Mixed rhinitis [vasomotor & allergic] / Rx cetirizine & singulair    MVP (mitral valve prolapse)    Obstructive apnea    DMG SPLIT AHI 43 SaO2 cliff 87 % CPAP 8     Obstructive apnea    DMG SPLIT AHI 43 SaO2 cliff 87 % CPAP 8 [facility]    ARMAAN on CPAP    Osteoarthritis    Other and unspecified hyperlipidemia    PONV (postoperative nausea and vomiting)    Pseudophakia of both eyes - EDYTA [place]     S/P hip replacement, right - 11/2019    R anterior ARTHUR performed by Dr. Rivera 11/18/19     Seborrheic dermatitis [CASH Doty - dermatologist]    Sleep apnea    no treatment    Sprain of tarsometatarsal (joint) (ligament) of foot RT FOOT LISFRANC LIG GLOBAL THRU 8/11/14    Unifocal PVCs / Holter (11/12)    Unspecified essential hypertension    Visual impairment    Glasses     Past Surgical History:   Procedure Laterality Date    Appendectomy      Appendectomy      Cath angio  6/19/2024    Colonoscopy  2009    diverticulosis and ischemic colitis    Colonoscopy  03/18/2014    Procedure: COLONOSCOPY;  Surgeon: Tino Haney MD;  Location:  ENDOSCOPY    Foot fracture surgery  05/2014    R foot 2nd metatarsal fusion/hardware    Hysterectomy      Injection, anesthetic/steroid, transforaminal epidural; lumbar/sacral, single level Right 05/18/2016    Procedure: LUMBAR / TRANSFORAMINAL EPIDURAL STEROID INJECTION;  Surgeon: Vivi Corona MD;  [facility]    [facility])      right foot neuroma removed     Orthopedic surg ([facility])      bilateral plantar fasciitis    Other  2018    Dr. Parish, eye lid lifted, plastic surgeon    Other surgical history      EGD - small hiatal hernia    Patient documented not to have experienced any of the following events Right 2016    Procedure: LUMBAR / TRANSFORAMINAL EPIDURAL STEROID INJECTION;  Surgeon: Vivi Corona MD;  Location: [facility]    Patient withough preoperative order for iv antibiotic surgical site infection prophylaxis. Right 2016    Procedure: LUMBAR / TRANSFORAMINAL EPIDURAL STEROID INJECTION;  Surgeon: Vivi Corona MD;  Location: [facility]    Removal of tonsils,10+ y/o      Total hip arthroplasty Right 2019    with Dr. Rivera 17     Family History   Problem Relation Age of Onset    Heart Disorder Father     Other (Other) Mother     Diabetes Maternal Grandmother      Family Status   Relation Status    Fa     Mo     Sis Alive    Sis Alive    MGMA      Social History     Occupational History    Not on file   Tobacco Use    Smoking status: Never    Smokeless tobacco: Never   Vaping Use    Vaping status: Never Used   Substance and Sexual Activity    Alcohol use: Yes     Alcohol/week: 1.0 standard drink of alcohol     Types: 1 Glasses of wine per week     Comment: 3 per week    Drug use: No    Sexual activity: Not on file       Medications:  Current Outpatient Medications   Medication Sig Dispense Refill    HYDROcodone-acetaminophen 5-325 MG Oral Tab Take 1-2 tablets by mouth every 6 (six) hours as needed for Pain (do not take this medication prior to drinking alcohol or driving as this medication can impair your senses.). Do not drink alcohol or drive while taking this medication as it can impair your senses. 12 tablet 0    Naloxone HCl 4 MG/0.1ML Nasal Liquid 4 mg by Intranasal route as needed (May repeat as needed in other nostril if symptoms persist). If patient remains  unresponsive, repeat dose in other nostril 2-5 minutes after first dose. 1 kit 0    clopidogrel 75 MG Oral Tab Take 1 tablet (75 mg total) by mouth daily.      ticagrelor 90 MG Oral Tab Take 1 tablet (90 mg total) by mouth every 12 (twelve) hours. 90 tablet 3    aspirin 81 MG Oral Tab EC Take 1 tablet (81 mg total) by mouth daily. 30 tablet 0    atorvastatin 40 MG Oral Tab Take 1 tablet (40 mg total) by mouth nightly.      propranolol 40 MG Oral Tab Take 1 tablet (40 mg total) by mouth 2 (two) times daily.      Vitamin K, Phytonadione, 100 MCG Oral Tab Take by mouth As Directed.      clonazePAM 0.5 MG Oral Tab Take 1 tablet (0.5 mg total) by mouth 2 (two) times daily as needed for Anxiety. 1/2 tablet in am/1 tablet HS      LOSARTAN 100 MG Oral Tab TAKE 1 TABLET BY MOUTH EVERY DAY (Patient taking differently: Take 0.5 tablets (50 mg total) by mouth daily.) 90 tablet 0    finasteride 5 MG Oral Tab Take 0.5 tablets (2.5 mg total) by mouth daily.      Probiotic Oral Cap 1 capsule daily 30 capsule 0    TraZODone HCl (DESYREL) 50 MG Oral Tab Take 1 tablet (50 mg total) by mouth nightly as needed.  0    BuPROPion HCl ER, XL, (WELLBUTRIN XL) 150 MG Oral Tablet 24 Hr Take 2 tablets (300 mg total) by mouth daily.      Polyethylene Glycol 3350 17 g Oral Powd Pack Take 17 g by mouth as needed.      Cetirizine HCl (ZYRTEC ALLERGY OR) Take  by mouth daily.      CO Q10 100 MG OR CAPS Take 2 Tabs by mouth daily.      CENTRUM SILVER ULTRA WOMENS OR 1 tablet daily      VITAMIN D3 1000 UNIT OR CAPS 1 CAPSULE DAILY         Allergies:  Allergies[1]    Review of Systems  A comprehensive review of systems was completed and is negative unless noted above or in the HPI.    Physical Exam  Ht 5' 5\" (1.651 m)   Wt 169 lb (76.7 kg)   LMP  (LMP Unknown)   BMI 28.12 kg/m²   Body mass index is 28.12 kg/m².    Constitutional: The patient appears well-developed and well-nourished, in no apparent distress.   Psychiatric: The patient demonstrates  good comprehension, judgment and decision making. Normal mood and affect.  Eyes: PER and EOM are normal.  ENT: Hearing appropriate for normal conversation.  Cardiovascular: The patient has symmetric pulses, 2+.  Distal extremity is warm and well perfused with good capillary refill.  Respiratory:  The patient is breathing comfortably without increased respiratory effort or use of accessory muscles.  Hematologic/Lymphatic: No lymphangitis. There is no appreciable enlargement of lymph nodes. No calf swelling, calf non-tender, negative Malin's sign. No edema.  Skin: No wounds or ulcers. No hypertrophic scarring.  Neck: FROM without pain.  MSK:  Gait: Antalgic to the right    right Knee        Alignment: Mild valgus, correctable       Skin: Intact, bruising is noted anteromedially but no open sores       Swelling: Minimal   Effusion: Trace   Tenderness: Anteromedial patella, Medial retinaculum, and MJL       Range of Motion:      Extension: 5     Flexion: 110     Flexion Contracture: 5     Extensor La           McMurrays: Negative   Lachmann: Negative   Anterior Drawer: Negative   Posterior Drawer: Negative   Varus Stress Test: stable   Valgus Stress Test: stable       Patellar Tracking: Centrally   Patellar Crepitus: Yes   Extensor Mechanism: Intact       Hip ROM:   Intact       Sensation: intact   Motor: intact   Vascular: intact     Imaging:  I independently reviewed the patient's relevant imaging studies today.    4 weightbearing views of the affected knee were obtained today.  They demonstrate no obvious acute fracture or dislocation.  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  They show patellofemoral joint space narrowing, osteophyte formation, and subchondral sclerosis.    Labs:  Lab Results   Component Value Date    WBC 5.1 2024    HGB 13.8 2024    HCT 40.5 2024    .0 2024     Lab Results   Component Value  Date    ALB 3.5 06/23/2024    A1C 5.3 03/12/2022     Lab Results   Component Value Date     (H) 06/23/2024    GLU 93 03/12/2022    GLU 95 03/09/2021       Assessment and Plan  Assessment & Plan  Contusion of right knee, initial encounter         Primary osteoarthritis of right knee         I had a lengthy discussion with the patient today about the patient's knee injury.  I recommend a conservative treatment approach.  I recommend using her knee mobilizer for a week and then getting her out of this with gradual increase in activity.  Specifically, I recommend the following:    Continue to use her knee immobilizer when she is up and moving for 1 week.  She is okay to remove this while sleeping.  After 1 week she can wean herself off as tolerated.  Continue elevation, icing, compression, use of an ambulatory aid such as a cane for symptomatic relief.  There are no structural abnormalities to their knee, so the patient can be WBAT and activities as tolerated.  Follow up: 2 weeks without x-rays.  If at that time she is doing well, we will initiate physical therapy  The patient understands and agrees with this plan. All of the patient's questions were answered today.      BMI is is above average; no BMI management plan is appropriate    Jed Parish MD         [1]   Allergies  Allergen Reactions    Sodium Lauryl Sulfate      Hair loss    Oseltamivir PAIN     Severe headache    Other OTHER (SEE COMMENTS)     Seasonal Allergies - sinus congestion    Tamiflu PAIN     Severe headache    Morphine RASH    Propylene Glycol RASH    Sulfa Antibiotics ITCHING

## 2025-04-08 ENCOUNTER — OFFICE VISIT (OUTPATIENT)
Age: 75
End: 2025-04-08
Payer: MEDICARE

## 2025-04-08 DIAGNOSIS — S80.01XD CONTUSION OF RIGHT KNEE, SUBSEQUENT ENCOUNTER: Primary | ICD-10-CM

## 2025-04-08 DIAGNOSIS — M25.561 CHRONIC PATELLOFEMORAL PAIN OF RIGHT KNEE: ICD-10-CM

## 2025-04-08 DIAGNOSIS — G89.29 CHRONIC PATELLOFEMORAL PAIN OF RIGHT KNEE: ICD-10-CM

## 2025-04-08 PROCEDURE — 99214 OFFICE O/P EST MOD 30 MIN: CPT | Performed by: ORTHOPAEDIC SURGERY

## 2025-04-08 PROCEDURE — 1160F RVW MEDS BY RX/DR IN RCRD: CPT | Performed by: ORTHOPAEDIC SURGERY

## 2025-04-08 PROCEDURE — 1159F MED LIST DOCD IN RCRD: CPT | Performed by: ORTHOPAEDIC SURGERY

## 2025-04-08 NOTE — PROGRESS NOTES
Chief Complaint   Patient presents with    Follow - Up     Right knee f/u -  Pt is still having pain. She is able to bend her knee a little better. Difficulty getting up from chair. Some popping I knee when walking. States pain due to a lump in back of her calf.        HPI  Marilyn Pichardo is a 75 year old female being seen in the office today for follow up of right knee. She has improvement in her pain but does note some discomfort anteriorly when she gets up from a seated position. Denies N/T..         The following portions of the patient's history were reviewed [unfilled] and updated as appropriate: allergies, current medications, past family history, past medical history, past social history, past surgical history and problem list.      Physical Exam  LMP  (LMP Unknown)   There is no height or weight on file to calculate BMI.  Ortho Exam  Right knee exam demonstrates no skin issues. She has minimal swelling today. She has pain in the peripatellar region today. + tenderness medial retinaculum. Extensor mechanism is intact. Ligament exam is stable. Non antalgic gait. Distally NV intact.     Imaging:  None    Assessment and Plan  Assessment & Plan  Contusion of right knee, subsequent encounter    Orders:    Physical Therapy Referral - External    Chronic patellofemoral pain of right knee    Orders:    Physical Therapy Referral - External      I had a lengthy discussion with the patient today about the patient's injury.  I am pleased with her progress. I do think she will benefit from outpatient PT. Treatment options were discussed with the patient at length. Specifically, I recommend the following:    Rest, ice, compression, elevation, anti-inflammatories/Tylenol for symptoms.  Referral to PT provided.   Activities: as tolerated  Follow up: 2 months without xrays.  The patient understands and agrees with this plan. All of the patient's questions were answered today.       No follow-ups on  file.    [unfilled]    Jedalf Parish MD

## 2025-06-15 ENCOUNTER — APPOINTMENT (OUTPATIENT)
Dept: GENERAL RADIOLOGY | Age: 75
End: 2025-06-15
Attending: NURSE PRACTITIONER
Payer: MEDICARE

## 2025-06-15 ENCOUNTER — HOSPITAL ENCOUNTER (OUTPATIENT)
Age: 75
Discharge: HOME | End: 2025-06-15
Payer: MEDICARE

## 2025-06-15 VITALS
RESPIRATION RATE: 18 BRPM | TEMPERATURE: 98 F | HEART RATE: 81 BPM | OXYGEN SATURATION: 97 % | SYSTOLIC BLOOD PRESSURE: 148 MMHG | DIASTOLIC BLOOD PRESSURE: 76 MMHG

## 2025-06-15 DIAGNOSIS — M79.672 LEFT FOOT PAIN: Primary | ICD-10-CM

## 2025-06-15 DIAGNOSIS — M19.072 ARTHRITIS OF FOOT, LEFT: ICD-10-CM

## 2025-06-15 PROCEDURE — 99213 OFFICE O/P EST LOW 20 MIN: CPT | Performed by: NURSE PRACTITIONER

## 2025-06-15 PROCEDURE — A6448 LT COMPRES BAND <3"/YD: HCPCS | Performed by: NURSE PRACTITIONER

## 2025-06-15 PROCEDURE — 73630 X-RAY EXAM OF FOOT: CPT | Performed by: NURSE PRACTITIONER

## 2025-06-15 NOTE — ED PROVIDER NOTES
Patient Seen in: [facility]       The following individual(s) verbally consented to be recorded using ambient AI listening technology and understand that they can each withdraw their consent to this listening technology at any point by asking the clinician to turn off or pause the recording:    Patient name: Marilyn Pichardo        History  Chief Complaint   Patient presents with    Fracture, Minor     Foot - Entered by patient    Pain     Stated Complaint: Fracture, Minor - Foot    Subjective:   The history is provided by the patient. No  was used.        Marilyn Pichardo is a 75 year old female with a history of falls and recent stent placement who presents with left foot pain.    She experiences pain on the dorsum of her left foot, which began after attending a  and being on her feet for an extended period. The pain started mildly on Friday, the day after the , and worsened by the time she returned home, accompanied by noticeable swelling. There is no specific injury to the foot, such as twisting or dropping something on it. She has been taking Tylenol for the pain, as she is on blood thinners due to a stent placement in her heart last . No history of gout is noted, and her shoes accommodate her high instep.    She has a history of multiple falls on her right leg since January, resulting in her favoring her left foot. The worst fall occurred in January when she split her leg open after falling on a stoop.     She was recently diagnosed with arthritis in her left wrist        Objective:     Past Medical History:    ALLERGIC RHINITIS    Anesthesia complication    BACK PAIN    Back problem    SI Joint issues    Cancer (HCC)    squamous cell skin cancer on arm and hand    Cervical disc syndrome Dx by MRI ()     Chronic rhinitis    Depression    Disorder of thyroid    History of thyroid issues when on birth control    Diverticulitis of colon (without  mention of hemorrhage)(562.11)    Diverticulosis of colon (without mention of hemorrhage)    Functional intestinal disorder / Colonoscopy [3/18/14] - recheck 10 years - RAYSHAWN Haney    H/O diverticulitis of colon [9/13], [12/13]    HEADACHES    HIGH BLOOD PRESSURE    HIGH CHOLESTEROL    History of blood transfusion    HOSPITALIZATIONS    HYPERLIPIDEMIA    Mixed rhinitis [vasomotor & allergic] / Rx cetirizine & singulair    MVP (mitral valve prolapse)    Obstructive apnea    DMG SPLIT AHI 43 SaO2 cliff 87 % CPAP 8     Obstructive apnea    DMG SPLIT AHI 43 SaO2 cliff 87 % CPAP 8 [facility]    ARMAAN on CPAP    Osteoarthritis    Other and unspecified hyperlipidemia    PONV (postoperative nausea and vomiting)    Pseudophakia of both eyes - EDYTA [place]     S/P hip replacement, right - 11/2019    R anterior ARTHUR performed by Dr. Rivera 11/18/19     Seborrheic dermatitis [CASH Doty - dermatologist]    Sleep apnea    no treatment    Sprain of tarsometatarsal (joint) (ligament) of foot RT FOOT LISFRANC LIG GLOBAL THRU 8/11/14    Unifocal PVCs / Holter (11/12)    Unspecified essential hypertension    Visual impairment    Glasses              Past Surgical History:   Procedure Laterality Date    Appendectomy      Appendectomy      Cath angio  6/19/2024    Colonoscopy  2009    diverticulosis and ischemic colitis    Colonoscopy  03/18/2014    Procedure: COLONOSCOPY;  Surgeon: Tino Haney MD;  Location:  ENDOSCOPY    Foot fracture surgery  05/2014    R foot 2nd metatarsal fusion/hardware    Hysterectomy      Injection, anesthetic/steroid, transforaminal epidural; lumbar/sacral, single level Right 05/18/2016    Procedure: LUMBAR / TRANSFORAMINAL EPIDURAL STEROID INJECTION;  Surgeon: Vivi Corona MD;  [facility]    Orthopedic surg ([facility])      right foot neuroma removed    Orthopedic surg ([facility])      bilateral plantar fasciitis    Other  05/18/2018    Dr. Parish, eye lid lifted, plastic surgeon    Other surgical  history  2009    EGD - small hiatal hernia    Patient documented not to have experienced any of the following events Right 05/18/2016    Procedure: LUMBAR / TRANSFORAMINAL EPIDURAL STEROID INJECTION;  Surgeon: Vivi Corona MD;  Location: [facility]    Patient withough preoperative order for iv antibiotic surgical site infection prophylaxis. Right 05/18/2016    Procedure: LUMBAR / TRANSFORAMINAL EPIDURAL STEROID INJECTION;  Surgeon: Vivi Corona MD;  Location: [facility]    Removal of tonsils,10+ y/o      Total hip arthroplasty Right 11/18/2019    with Dr. Rivera 11/18/19                Social History     Socioeconomic History    Marital status:    Tobacco Use    Smoking status: Never    Smokeless tobacco: Never   Vaping Use    Vaping status: Never Used   Substance and Sexual Activity    Alcohol use: Yes     Alcohol/week: 1.0 standard drink of alcohol     Types: 1 Glasses of wine per week     Comment: 3 per week    Drug use: No              Review of Systems    Positive for stated complaint: Fracture, Minor - Foot  Other systems are as noted in HPI.  Constitutional and vital signs reviewed.      All other systems reviewed and negative except as noted above.      Physical Exam    ED Triage Vitals [06/15/25 1139]   /76   Pulse 85   Resp 18   Temp 98.3 °F (36.8 °C)   Temp src Oral   SpO2 97 %   O2 Device None (Room air)       Current Vitals:   Vital Signs  BP: 148/76  Pulse: 85  Resp: 18  Temp: 98.3 °F (36.8 °C)  Temp src: Oral    Oxygen Therapy  SpO2: 97 %  O2 Device: None (Room air)            Physical Exam  Vitals and nursing note reviewed.   Constitutional:       General: She is not in acute distress.     Appearance: Normal appearance. She is not ill-appearing or toxic-appearing.   HENT:      Head: Normocephalic and atraumatic.      Nose: Nose normal.      Mouth/Throat:      Mouth: Mucous membranes are moist.      Pharynx: Oropharynx is clear.   Eyes:      Pupils:  Pupils are equal, round, and reactive to light.   Cardiovascular:      Rate and Rhythm: Normal rate and regular rhythm.      Pulses: Normal pulses.   Pulmonary:      Effort: Pulmonary effort is normal. No respiratory distress.      Breath sounds: Normal breath sounds.      Comments: Lungs clear.  No adventitious lung sounds.  No distress.  No hypoxia.  Pulse ox 97% ra. Which is normal    Abdominal:      General: Abdomen is flat.   Musculoskeletal:         General: No signs of injury. Normal range of motion.      Cervical back: Normal range of motion and neck supple.      Left foot: Normal range of motion and normal capillary refill. Swelling (mild erythema, no warmth) and bony tenderness (mild, mid left 2-4 metatarsals) present. No deformity or crepitus. Normal pulse.   Skin:     General: Skin is warm and dry.      Capillary Refill: Capillary refill takes less than 2 seconds.   Neurological:      General: No focal deficit present.      Mental Status: She is alert and oriented to person, place, and time.      GCS: GCS eye subscore is 4. GCS verbal subscore is 5. GCS motor subscore is 6.   Psychiatric:         Mood and Affect: Mood normal.         Behavior: Behavior normal.         Thought Content: Thought content normal.         Judgment: Judgment normal.             ED Course  Labs Reviewed - No data to display  XR FOOT, COMPLETE (MIN 3 VIEWS), LEFT (CPT=73630)  Result Date: 6/15/2025  CONCLUSION:  No acute fracture or dislocation.   LOCATION:  [facility]   Dictated by (CST): Julián Marmolejo MD on 6/15/2025 at 12:39 PM     Finalized by (CST): Julián Marmolejo MD on 6/15/2025 at 12:39 PM            [facility]         Medical Decision Making  Differential diagnoses reflecting the complexity of care include: Left foot pain, fracture, gout, arthritis  Atraumatic pain in the left foot.  Mild tenderness to the dorsal foot with some swelling  Not consistent with gout  No cellulitis   x-ray of the foot is negative for fracture or  dislocation.  There are some arthritic changes noted that were discussed with the patient  Ace wrap applied for support.  CMS intact    Plan of Care:Recommend Tylenol, ice, rest, Ace wrap, avoid tight fitting shoes.  Follow-up with podiatry, referral provided    Results and plan of care discussed with the patient/family. They are in agreement with discharge. They understand to follow up with their primary doctor or the referral physician for further evaluation, especially if no improvement.  Also discussed the limitations of immediate care, patient is aware that if symptoms are worse they should go to the emergency room. Verbal and written discharge instructions were given.     My independent interpretation of studies of: No fracture on left foot x-ray  Diagnostic tests and medications considered but not ordered were: No uric acid testing available at this site  Shared decision making was done by: Patient agreeable to Ace wrap  Comorbidities that add complexity to management include: Arthritis, hypertension  External chart review was done and was noted: Reviewed, being followed by physiatry, neurology  History obtained by an independent source was from: N/A  Discussions and management was done with: N/A   Social determinants of health that affect care: age              Problems Addressed:  Arthritis of foot, left: acute illness or injury  Left foot pain: acute illness or injury    Amount and/or Complexity of Data Reviewed  External Data Reviewed: notes.  Radiology: ordered and independent interpretation performed. Decision-making details documented in ED Course.    Risk  OTC drugs.        Disposition and Plan     Clinical Impression:  1. Left foot pain    2. Arthritis of foot, left         Disposition:  Discharge  6/15/2025 12:43 pm    Follow-up:  Karli Gutierrez MD  [address] [address]  [phone]          Zachary Landon DPM  [address]  [address]  [phone]                Medications Prescribed:  Current Discharge Medication List                Supplementary Documentation:

## 2025-06-15 NOTE — ED INITIAL ASSESSMENT (HPI)
No known injury.  Pain to top of left foot became worse yesterday after standing for a long time.

## 2025-06-15 NOTE — DISCHARGE INSTRUCTIONS
Try wearing the Ace wrap.  Continue Tylenol.  Ice.  Rest.  Avoid tight fitting shoes.  Follow-up with podiatry

## (undated) NOTE — LETTER
24      Orthopedic Surgery   Pre-Operative Clearance Request    Patient Name:   Marilyn Pichardo             :   1950    Surgeon: Dr. Taveras             Date of Surgery: 2024    Surgical Procedure: RIGHT SHOULDER ARTHROSCOPY ARTHROSCOPIC BICEPS TENODESIS SUBACROMIAL DECOMPRESSION DISTAL CLAVICLE EXCISION       MUST COMPLETE ALL OF THE FOLLOWING 2-3 WEEKS PRIOR TO YOUR SURGERY TO AVOID CANCELLATION, DUE TO THE RULE THEIR WILL BE NO EXCEPTIONS!      [x]  History and Physical      [x]  Medical  Clearance                              **Please fax test results, H&P, and clearance to [phone] and to P.A.T at [phone]**

## (undated) NOTE — LETTER
24      Orthopedic Surgery   Pre-Operative Clearance Request    Patient Name:   Marilyn Pichardo             :   1950    Surgeon: Dr. Taveras             Date of Surgery: 2024    Surgical Procedure: RIGHT SHOULDER ARTHROSCOPY ARTHROSCOPIC BICEPS TENODESIS SUBACROMIAL DECOMPRESSION DISTAL CLAVICLE EXCISION       MUST COMPLETE ALL OF THE FOLLOWING 2-3 WEEKS PRIOR TO YOUR SURGERY TO AVOID CANCELLATION, DUE TO THE RULE THEIR WILL BE NO EXCEPTIONS!      [x]  History and Physical      [x]  Medical  Clearance                                                                                                     **Please fax test results, H&P, and clearance to [phone] and to P.A.T at [phone]**